# Patient Record
Sex: MALE | Race: WHITE | Employment: OTHER | URBAN - METROPOLITAN AREA
[De-identification: names, ages, dates, MRNs, and addresses within clinical notes are randomized per-mention and may not be internally consistent; named-entity substitution may affect disease eponyms.]

---

## 2017-03-13 ENCOUNTER — PATIENT OUTREACH (OUTPATIENT)
Dept: INTERNAL MEDICINE CLINIC | Facility: CLINIC | Age: 82
End: 2017-03-13

## 2017-03-13 NOTE — PROGRESS NOTES
Spoke with patient spouse to have patient call office and schedule an AWV; last was 02/26/2016; spouse informed she will let patient know.

## 2017-04-09 DIAGNOSIS — E03.4 HYPOTHYROIDISM DUE TO ACQUIRED ATROPHY OF THYROID: Primary | ICD-10-CM

## 2017-04-10 RX ORDER — LEVOTHYROXINE SODIUM 0.1 MG/1
TABLET ORAL
Qty: 90 TABLET | Refills: 1 | Status: SHIPPED | OUTPATIENT
Start: 2017-04-10 | End: 2017-06-26

## 2017-04-15 ENCOUNTER — MED REC SCAN ONLY (OUTPATIENT)
Dept: INTERNAL MEDICINE CLINIC | Facility: CLINIC | Age: 82
End: 2017-04-15

## 2017-06-25 DIAGNOSIS — E03.4 HYPOTHYROIDISM DUE TO ACQUIRED ATROPHY OF THYROID: ICD-10-CM

## 2017-06-26 RX ORDER — LEVOTHYROXINE SODIUM 0.1 MG/1
TABLET ORAL
Qty: 90 TABLET | Refills: 0 | Status: SHIPPED | OUTPATIENT
Start: 2017-06-26 | End: 2017-09-18

## 2017-08-14 DIAGNOSIS — E03.9 ACQUIRED HYPOTHYROIDISM: ICD-10-CM

## 2017-08-14 RX ORDER — LIOTHYRONINE SODIUM 5 UG/1
TABLET ORAL
Qty: 270 TABLET | Refills: 1 | OUTPATIENT
Start: 2017-08-14 | End: 2018-04-30

## 2017-08-17 ENCOUNTER — MED REC SCAN ONLY (OUTPATIENT)
Dept: INTERNAL MEDICINE CLINIC | Facility: CLINIC | Age: 82
End: 2017-08-17

## 2017-09-18 DIAGNOSIS — E03.4 HYPOTHYROIDISM DUE TO ACQUIRED ATROPHY OF THYROID: ICD-10-CM

## 2017-09-18 RX ORDER — LEVOTHYROXINE SODIUM 0.1 MG/1
TABLET ORAL
Qty: 90 TABLET | Refills: 0 | Status: SHIPPED | OUTPATIENT
Start: 2017-09-18 | End: 2018-05-15

## 2017-10-27 ENCOUNTER — MED REC SCAN ONLY (OUTPATIENT)
Dept: INTERNAL MEDICINE CLINIC | Facility: CLINIC | Age: 82
End: 2017-10-27

## 2017-12-13 ENCOUNTER — OFFICE VISIT (OUTPATIENT)
Dept: INTERNAL MEDICINE CLINIC | Facility: CLINIC | Age: 82
End: 2017-12-13

## 2017-12-13 VITALS
HEART RATE: 70 BPM | SYSTOLIC BLOOD PRESSURE: 122 MMHG | BODY MASS INDEX: 24.91 KG/M2 | OXYGEN SATURATION: 98 % | WEIGHT: 174 LBS | RESPIRATION RATE: 16 BRPM | TEMPERATURE: 98 F | DIASTOLIC BLOOD PRESSURE: 80 MMHG | HEIGHT: 70 IN

## 2017-12-13 DIAGNOSIS — Z12.11 COLON CANCER SCREENING: ICD-10-CM

## 2017-12-13 DIAGNOSIS — K21.9 GASTROESOPHAGEAL REFLUX DISEASE WITHOUT ESOPHAGITIS: ICD-10-CM

## 2017-12-13 DIAGNOSIS — E03.4 HYPOTHYROIDISM DUE TO ACQUIRED ATROPHY OF THYROID: ICD-10-CM

## 2017-12-13 DIAGNOSIS — Z00.00 ENCOUNTER FOR ANNUAL HEALTH EXAMINATION: Primary | ICD-10-CM

## 2017-12-13 PROCEDURE — G0439 PPPS, SUBSEQ VISIT: HCPCS | Performed by: STUDENT IN AN ORGANIZED HEALTH CARE EDUCATION/TRAINING PROGRAM

## 2017-12-13 NOTE — PATIENT INSTRUCTIONS
AMISHA EMMANUEL's SCREENING SCHEDULE   Tests on this list are recommended by your physician but may not be covered, or covered at this frequency, by your insurer. Please check with your insurance carrier before scheduling to verify coverage.     PREVEN Covered every 5 years No results found for this or any previous visit. No flowsheet data found.      Fecal Occult Blood   Covered Annually Occult Blood Result (no units)   Date Value   11/22/2016 Negative for Occult Blood   11/22/2016 Negative for Occult Bl be covered with your prescription benefits, but Medicare does not cover unless Medically needed    Zoster (Not covered by Medicare Part B) No orders found for this or any previous visit.  This may be covered with your pharmacy  prescription benefits     Rec

## 2017-12-13 NOTE — PROGRESS NOTES
HPI: Derek Frye is a 80year old male who presents for a Medicare Subsequent Annual Wellness visit (Pt already had Initial Annual Wellness).       His last annual assessment has been over 1 year: Annual Physical due on 02/26/2017      HPI:  Here quit greater than 12 months ago.   Smoking status: Former Smoker                                                              Packs/day: 0.00      Years: 0.00         Quit date: 4/17/1965  Smokeless tobacco: Never Used                             CAGE Claudean Alar Gastroenteritis; Hiatal hernia (1996); JAYLIN (obstructive sleep apnea); Reflux; and Unspecified disorder of thyroid.     He  has a past surgical history that includes colonoscopy (2004); diagnostic anoscopy (2004); hernia surgery (1998); upper gi endo poss ba Head:  Normocephalic, without obvious abnormality, atraumatic   Eyes:  PERRL, conjunctiva/corneas clear, EOM's intact, both eyes   Ears:  Normal TM's and external ear canals, both ears   Nose: Nares normal, septum midline, mucosa normal, no drainage or s (gastroesophageal reflux disease): well controlled. Continue Pantoprazole. Avoid gerd aggravating foods. Health maintenance: utd with vaccinations. Stool cards given to the patient today for colorectal Ca screening.   The patient indicates understanding of Ophthalmology Visit Annually: Diabetics, FHx Glaucoma, AA>50, > 65 No flowsheet data found. Prostate Cancer Screening      PSA  Annually There are no preventive care reminders to display for this patient.   Update Health Maintenance if applic

## 2018-04-26 ENCOUNTER — TELEPHONE (OUTPATIENT)
Dept: INTERNAL MEDICINE CLINIC | Facility: CLINIC | Age: 83
End: 2018-04-26

## 2018-04-26 ENCOUNTER — OFFICE VISIT (OUTPATIENT)
Dept: INTERNAL MEDICINE CLINIC | Facility: CLINIC | Age: 83
End: 2018-04-26

## 2018-04-26 VITALS
SYSTOLIC BLOOD PRESSURE: 126 MMHG | BODY MASS INDEX: 25.34 KG/M2 | WEIGHT: 177 LBS | RESPIRATION RATE: 16 BRPM | DIASTOLIC BLOOD PRESSURE: 88 MMHG | OXYGEN SATURATION: 96 % | HEART RATE: 67 BPM | TEMPERATURE: 98 F | HEIGHT: 70 IN

## 2018-04-26 DIAGNOSIS — I20.8 ATYPICAL ANGINA (HCC): Primary | ICD-10-CM

## 2018-04-26 DIAGNOSIS — Z12.11 COLON CANCER SCREENING: ICD-10-CM

## 2018-04-26 PROCEDURE — 93000 ELECTROCARDIOGRAM COMPLETE: CPT | Performed by: INTERNAL MEDICINE

## 2018-04-26 PROCEDURE — 99214 OFFICE O/P EST MOD 30 MIN: CPT | Performed by: INTERNAL MEDICINE

## 2018-04-26 NOTE — PROGRESS NOTES
HPI:    Patient ID: Loan Swartz is a 80year old male. Chest Pain    This is a new problem. The current episode started in the past 7 days. The onset quality is sudden. The problem occurs intermittently. The problem has been waxing and waning.  Lucy Nix Disp: 90 tablet Rfl: 1   Melatonin 5 MG Oral Tab Take by mouth nightly. Disp:  Rfl:    Multiple Vitamins-Minerals (MULTIVITAMIN OR) Take  by mouth. Disp:  Rfl:    Misc Natural Products (TURMERIC CURCUMIN) Oral Cap Take by mouth daily.    Disp:  Rfl:    Asco

## 2018-04-26 NOTE — TELEPHONE ENCOUNTER
Patient has not done his occult blood test because on the card it says not to do it if you have hemorrhoids. Please advise patient on what to do he is in office seeing  for another matter.

## 2018-04-26 NOTE — PATIENT INSTRUCTIONS
What Is Angina? Angina is a warning that your heart muscle is not getting enough oxygen-rich blood and is at risk for damage. Medicines, certain medical procedures, and lifestyle changes can help control angina.  Talk with your healthcare provider about

## 2018-04-26 NOTE — TELEPHONE ENCOUNTER
Dr. Jaron Sow has discussed recommendations with patient. Advised he proceed with test.   Patient aware and agreed with plan.

## 2018-04-30 ENCOUNTER — TELEPHONE (OUTPATIENT)
Dept: INTERNAL MEDICINE CLINIC | Facility: CLINIC | Age: 83
End: 2018-04-30

## 2018-04-30 DIAGNOSIS — E03.9 ACQUIRED HYPOTHYROIDISM: ICD-10-CM

## 2018-04-30 RX ORDER — LIOTHYRONINE SODIUM 5 UG/1
TABLET ORAL
Qty: 270 TABLET | Refills: 1 | COMMUNITY
Start: 2018-04-30 | End: 2018-09-14

## 2018-04-30 NOTE — TELEPHONE ENCOUNTER
Refill request from [facility] for Liothyronine w/ methocel 7.5mcg capsules, qty 270. Fax back to #[phone]. Form in triage.

## 2018-05-04 ENCOUNTER — HOSPITAL ENCOUNTER (EMERGENCY)
Facility: HOSPITAL | Age: 83
Discharge: LEFT BEFORE DISCHARGE | End: 2018-05-04
Payer: MEDICARE

## 2018-05-04 VITALS
HEART RATE: 68 BPM | BODY MASS INDEX: 23.62 KG/M2 | OXYGEN SATURATION: 98 % | SYSTOLIC BLOOD PRESSURE: 125 MMHG | WEIGHT: 165 LBS | RESPIRATION RATE: 18 BRPM | HEIGHT: 70 IN | TEMPERATURE: 98 F | DIASTOLIC BLOOD PRESSURE: 78 MMHG

## 2018-05-04 NOTE — ED INITIAL ASSESSMENT (HPI)
Patient was stung by a hornet about 1.5 hours ago. Under his right arm.  He immediately took benadryl one pill=25mg  Denies SOB CP   25 years ago he was stung by a bee and had a reaction can not remember what his symptom were

## 2018-05-15 DIAGNOSIS — E03.4 HYPOTHYROIDISM DUE TO ACQUIRED ATROPHY OF THYROID: ICD-10-CM

## 2018-05-15 RX ORDER — LEVOTHYROXINE SODIUM 0.1 MG/1
TABLET ORAL
Qty: 90 TABLET | Refills: 0 | Status: SHIPPED | OUTPATIENT
Start: 2018-05-15 | End: 2018-07-30

## 2018-05-15 NOTE — TELEPHONE ENCOUNTER
Last TSH 10/2017 and it was normal. Last OV 4/26/18. Ok per Dr. Hany Vogt to refill.      Repeat labs 10/2018

## 2018-05-27 PROCEDURE — 82270 OCCULT BLOOD FECES: CPT

## 2018-05-27 PROCEDURE — 82272 OCCULT BLD FECES 1-3 TESTS: CPT

## 2018-05-30 ENCOUNTER — APPOINTMENT (OUTPATIENT)
Dept: LAB | Age: 83
End: 2018-05-30
Attending: INTERNAL MEDICINE
Payer: MEDICARE

## 2018-05-30 DIAGNOSIS — Z12.11 COLON CANCER SCREENING: ICD-10-CM

## 2018-07-30 DIAGNOSIS — E03.4 HYPOTHYROIDISM DUE TO ACQUIRED ATROPHY OF THYROID: ICD-10-CM

## 2018-07-30 RX ORDER — LEVOTHYROXINE SODIUM 0.1 MG/1
TABLET ORAL
Qty: 90 TABLET | Refills: 0 | Status: SHIPPED | OUTPATIENT
Start: 2018-07-30 | End: 2018-09-14

## 2018-09-27 ENCOUNTER — OFFICE VISIT (OUTPATIENT)
Dept: INTERNAL MEDICINE CLINIC | Facility: CLINIC | Age: 83
End: 2018-09-27
Payer: MEDICARE

## 2018-09-27 VITALS
SYSTOLIC BLOOD PRESSURE: 132 MMHG | WEIGHT: 173 LBS | BODY MASS INDEX: 24.77 KG/M2 | HEART RATE: 69 BPM | RESPIRATION RATE: 16 BRPM | HEIGHT: 70 IN | DIASTOLIC BLOOD PRESSURE: 82 MMHG | TEMPERATURE: 98 F

## 2018-09-27 DIAGNOSIS — H81.13 BENIGN PAROXYSMAL POSITIONAL VERTIGO DUE TO BILATERAL VESTIBULAR DISORDER: Primary | ICD-10-CM

## 2018-09-27 DIAGNOSIS — Z23 NEED FOR INFLUENZA VACCINATION: ICD-10-CM

## 2018-09-27 DIAGNOSIS — K21.00 GASTROESOPHAGEAL REFLUX DISEASE WITH ESOPHAGITIS: ICD-10-CM

## 2018-09-27 PROCEDURE — 99214 OFFICE O/P EST MOD 30 MIN: CPT | Performed by: INTERNAL MEDICINE

## 2018-09-27 PROCEDURE — G0008 ADMIN INFLUENZA VIRUS VAC: HCPCS | Performed by: INTERNAL MEDICINE

## 2018-09-27 PROCEDURE — 90653 IIV ADJUVANT VACCINE IM: CPT | Performed by: INTERNAL MEDICINE

## 2018-09-27 RX ORDER — PREDNISONE 20 MG/1
TABLET ORAL
Qty: 14 TABLET | Refills: 0 | Status: SHIPPED | OUTPATIENT
Start: 2018-09-27 | End: 2019-01-29

## 2018-09-27 NOTE — PROGRESS NOTES
HPI:    Patient ID: Theresa Baugh is a 80year old male. Dizziness   This is a recurrent problem. The current episode started more than 1 month ago. The problem occurs every several days. The problem has been gradually worsening.  Associated symptom 14 tablet Rfl: 0   Levothyroxine Sodium 100 MCG Oral Tab Take 1 tablet (100 mcg total) by mouth once daily.  Disp: 90 tablet Rfl: 0   Liothyronine Sodium 5 MCG Oral Tab 7.5mcg TID Disp: 270 tablet Rfl: 1   metRONIDAZOLE 0.75 % External Cream Apply 1 Applica vaccination  - BPPV- finish prednisone. Refer to PT for correction  - gerd- stable.  Cont PPI  Orders Placed This Encounter      Fluad High Dose 72 ys and older 0.5 ml [61230]      Meds This Visit:  Requested Prescriptions     Signed Prescriptions Disp Refi

## 2018-10-15 DIAGNOSIS — E03.4 HYPOTHYROIDISM DUE TO ACQUIRED ATROPHY OF THYROID: ICD-10-CM

## 2018-10-15 RX ORDER — LEVOTHYROXINE SODIUM 0.1 MG/1
TABLET ORAL
Qty: 90 TABLET | Refills: 0 | OUTPATIENT
Start: 2018-10-15

## 2018-10-19 DIAGNOSIS — E03.4 HYPOTHYROIDISM DUE TO ACQUIRED ATROPHY OF THYROID: ICD-10-CM

## 2018-10-22 RX ORDER — LEVOTHYROXINE SODIUM 0.1 MG/1
TABLET ORAL
Qty: 90 TABLET | Refills: 0 | Status: SHIPPED | OUTPATIENT
Start: 2018-10-22 | End: 2019-01-07

## 2018-11-14 ENCOUNTER — TELEPHONE (OUTPATIENT)
Dept: INTERNAL MEDICINE CLINIC | Facility: CLINIC | Age: 83
End: 2018-11-14

## 2018-11-14 NOTE — TELEPHONE ENCOUNTER
Patient was referred to Dr Melonie Dubin, but when he called to make an appt was told the soonest opening was end of January - patient wants to know if there is another doctor we can recommend. Please c/b on cell #.

## 2018-11-14 NOTE — TELEPHONE ENCOUNTER
Spoke with pt he only asked to see Dr. Alessandro Zaman. Pt informed he can see anyone provider in that group. He will call back tomorrow for appt.

## 2019-01-02 LAB
AMB EXT CHOL/HDL RATIO: 4.4
AMB EXT CHOLESTEROL, TOTAL: 193 MG/DL
AMB EXT CREATININE: 0.92 MG/DL
AMB EXT GLUCOSE: 102 MG/DL
AMB EXT HDL CHOLESTEROL: 44 MG/DL
AMB EXT HEMATOCRIT: 43
AMB EXT HEMOGLOBIN: 14.4
AMB EXT HGBA1C: 5.4 %
AMB EXT LDL CHOLESTEROL, DIRECT: 126 MG/DL
AMB EXT MCV: 91
AMB EXT PLATELETS: 168
AMB EXT TRIGLYCERIDES: 115 MG/DL
AMB EXT TSH: 0.34 MIU/ML
AMB EXT WBC: 4.2 X10(3)UL

## 2019-01-07 DIAGNOSIS — E03.4 HYPOTHYROIDISM DUE TO ACQUIRED ATROPHY OF THYROID: ICD-10-CM

## 2019-01-07 RX ORDER — LEVOTHYROXINE SODIUM 0.1 MG/1
TABLET ORAL
Qty: 90 TABLET | Refills: 0 | Status: SHIPPED | OUTPATIENT
Start: 2019-01-07 | End: 2019-04-09

## 2019-01-29 ENCOUNTER — OFFICE VISIT (OUTPATIENT)
Dept: INTERNAL MEDICINE CLINIC | Facility: CLINIC | Age: 84
End: 2019-01-29
Payer: MEDICARE

## 2019-01-29 VITALS
SYSTOLIC BLOOD PRESSURE: 118 MMHG | HEART RATE: 68 BPM | WEIGHT: 170 LBS | RESPIRATION RATE: 16 BRPM | HEIGHT: 70 IN | BODY MASS INDEX: 24.34 KG/M2 | TEMPERATURE: 98 F | DIASTOLIC BLOOD PRESSURE: 60 MMHG

## 2019-01-29 DIAGNOSIS — E03.9 ACQUIRED HYPOTHYROIDISM: ICD-10-CM

## 2019-01-29 DIAGNOSIS — S39.011A STRAIN OF ABDOMINAL MUSCLE, INITIAL ENCOUNTER: Primary | ICD-10-CM

## 2019-01-29 PROCEDURE — 99213 OFFICE O/P EST LOW 20 MIN: CPT | Performed by: INTERNAL MEDICINE

## 2019-01-29 RX ORDER — LIOTHYRONINE SODIUM 5 UG/1
TABLET ORAL
Qty: 270 TABLET | Refills: 0 | Status: SHIPPED | OUTPATIENT
Start: 2019-01-29 | End: 2019-06-11

## 2019-01-29 NOTE — PROGRESS NOTES
HPI:    Patient ID: Vish Anderson is a 80year old male. Abdominal Pain   This is a new problem. The current episode started 1 to 4 weeks ago. The onset quality is sudden. The problem occurs intermittently. The problem has been unchanged.  The pain daily.   Disp:  Rfl:    Nutritional Supplements (5-HTP TRYPTOPHAN OR) Take  by mouth.  Disp:  Rfl:    Liothyronine Sodium 5 MCG Oral Tab 7.5mcg TID Disp: 270 tablet Rfl: 0     Allergies:No Known Allergies   PHYSICAL EXAM:   Physical Exam   Constitutional: H

## 2019-02-01 ENCOUNTER — MED REC SCAN ONLY (OUTPATIENT)
Dept: INTERNAL MEDICINE CLINIC | Facility: CLINIC | Age: 84
End: 2019-02-01

## 2019-02-11 ENCOUNTER — OFFICE VISIT (OUTPATIENT)
Dept: INTERNAL MEDICINE CLINIC | Facility: CLINIC | Age: 84
End: 2019-02-11
Payer: MEDICARE

## 2019-02-11 ENCOUNTER — TELEPHONE (OUTPATIENT)
Dept: INTERNAL MEDICINE CLINIC | Facility: CLINIC | Age: 84
End: 2019-02-11

## 2019-02-11 VITALS
HEART RATE: 73 BPM | SYSTOLIC BLOOD PRESSURE: 124 MMHG | TEMPERATURE: 98 F | DIASTOLIC BLOOD PRESSURE: 70 MMHG | RESPIRATION RATE: 16 BRPM | HEIGHT: 70 IN | BODY MASS INDEX: 24.34 KG/M2 | WEIGHT: 170 LBS

## 2019-02-11 DIAGNOSIS — K40.90 RIGHT INGUINAL HERNIA: Primary | ICD-10-CM

## 2019-02-11 PROCEDURE — 99213 OFFICE O/P EST LOW 20 MIN: CPT | Performed by: INTERNAL MEDICINE

## 2019-02-11 NOTE — TELEPHONE ENCOUNTER
Pt seen Dr. Rose Mary Avalos for abd pain and was told to call back if not better, pt states still having some pain and found a lump on rt side, call back

## 2019-02-11 NOTE — PROGRESS NOTES
HPI:    Patient ID: James Sanders is a 80year old male. Abdominal Pain   This is a new problem. The current episode started in the past 7 days. The onset quality is sudden. The problem has been unchanged.  Pain location: right inguinal. The pain is Disp:  Rfl:    PHOSPHATIDYL CHOLINE OR Take by mouth daily. Disp:  Rfl:    Nutritional Supplements (5-HTP TRYPTOPHAN OR) Take  by mouth.  Disp:  Rfl:      Allergies:No Known Allergies   PHYSICAL EXAM:   Physical Exam   Constitutional: He appears well-deve

## 2019-02-11 NOTE — TELEPHONE ENCOUNTER
Patient states he was seen a couple of weeks ago for low abd pain. Patient advised it was a muscle strain and has been treating with tylenol since.    Patient states he now has a lump on his lower right side and he does not think this was there when evaluat

## 2019-02-20 ENCOUNTER — OFFICE VISIT (OUTPATIENT)
Dept: SURGERY | Facility: CLINIC | Age: 84
End: 2019-02-20
Payer: MEDICARE

## 2019-02-20 VITALS
SYSTOLIC BLOOD PRESSURE: 138 MMHG | DIASTOLIC BLOOD PRESSURE: 74 MMHG | WEIGHT: 170 LBS | HEART RATE: 66 BPM | BODY MASS INDEX: 24 KG/M2

## 2019-02-20 DIAGNOSIS — K40.20 BILATERAL INGUINAL HERNIA WITHOUT OBSTRUCTION OR GANGRENE, RECURRENCE NOT SPECIFIED: Primary | ICD-10-CM

## 2019-02-20 PROCEDURE — 99203 OFFICE O/P NEW LOW 30 MIN: CPT | Performed by: SURGERY

## 2019-02-20 NOTE — H&P
New Patient Visit Note       Active Problems      1.  Bilateral inguinal hernia without obstruction or gangrene, recurrence not specified        Chief Complaint   Patient presents with:  Hernia: NP RIGHT INGUINAL HERNIA REF BY PCP, HAD PREVIOUS BILATERAL RE • HERNIA SURGERY  1998    laparoscopic repair   • UPPER GI ENDO POSS BARRTTS  4/14    poss Shane's; esophagitis; HH; polyp       The family history and social history have been reviewed by me today.     Family History   Problem Relation Age of Onset   • (5-HTP TRYPTOPHAN OR), Take  by mouth., Disp: , Rfl:       Review of Systems  The Review of Systems has been reviewed by me during today. Review of Systems   Constitutional: Negative for chills, diaphoresis, fatigue, fever and unexpected weight change. inguinal area. Genitourinary: Penis normal.       Right testis shows no mass and no swelling. Right testis is descended. Cremasteric reflex is not absent on the right side. Left testis shows no mass, no swelling and no tenderness.  Left testis is desc

## 2019-02-21 RX ORDER — CEFAZOLIN SODIUM/WATER 2 G/20 ML
2 SYRINGE (ML) INTRAVENOUS ONCE
Status: CANCELLED | OUTPATIENT
Start: 2019-02-21 | End: 2019-02-21

## 2019-02-21 RX ORDER — SODIUM CHLORIDE, SODIUM LACTATE, POTASSIUM CHLORIDE, CALCIUM CHLORIDE 600; 310; 30; 20 MG/100ML; MG/100ML; MG/100ML; MG/100ML
INJECTION, SOLUTION INTRAVENOUS CONTINUOUS
Status: CANCELLED | OUTPATIENT
Start: 2019-02-21

## 2019-02-21 RX ORDER — ACETAMINOPHEN 500 MG
1000 TABLET ORAL ONCE
Status: CANCELLED | OUTPATIENT
Start: 2019-02-21 | End: 2019-02-21

## 2019-02-21 RX ORDER — HEPARIN SODIUM 5000 [USP'U]/ML
5000 INJECTION, SOLUTION INTRAVENOUS; SUBCUTANEOUS ONCE
Status: CANCELLED | OUTPATIENT
Start: 2019-02-21 | End: 2019-02-21

## 2019-02-26 ENCOUNTER — OFFICE VISIT (OUTPATIENT)
Dept: INTERNAL MEDICINE CLINIC | Facility: CLINIC | Age: 84
End: 2019-02-26
Payer: MEDICARE

## 2019-02-26 VITALS
TEMPERATURE: 98 F | HEART RATE: 64 BPM | BODY MASS INDEX: 24.2 KG/M2 | HEIGHT: 70 IN | DIASTOLIC BLOOD PRESSURE: 60 MMHG | RESPIRATION RATE: 16 BRPM | WEIGHT: 169 LBS | SYSTOLIC BLOOD PRESSURE: 114 MMHG

## 2019-02-26 DIAGNOSIS — N41.0 ACUTE PROSTATITIS: ICD-10-CM

## 2019-02-26 DIAGNOSIS — Z01.810 PREOP CARDIOVASCULAR EXAM: Primary | ICD-10-CM

## 2019-02-26 DIAGNOSIS — K40.20 NON-RECURRENT BILATERAL INGUINAL HERNIA WITHOUT OBSTRUCTION OR GANGRENE: ICD-10-CM

## 2019-02-26 PROCEDURE — 99214 OFFICE O/P EST MOD 30 MIN: CPT | Performed by: INTERNAL MEDICINE

## 2019-02-26 RX ORDER — LEVOFLOXACIN 500 MG/1
500 TABLET, FILM COATED ORAL DAILY
Qty: 10 TABLET | Refills: 0 | Status: SHIPPED | OUTPATIENT
Start: 2019-02-26 | End: 2019-03-08

## 2019-02-27 NOTE — H&P
ED [facility], [address] [place] [address]    History and Physical    Samuel Lozano Patient Status:  No patient class for patient encounter    1932 MRN   [id number]   Location [address] [facility], [facility] Attending No att. nightly. Disp:  Rfl:    Multiple Vitamins-Minerals (MULTIVITAMIN OR) Take by mouth daily. Disp:  Rfl:    Misc Natural Products (TURMERIC CURCUMIN) Oral Cap Take by mouth daily.    Disp:  Rfl:    Ascorbic Acid (VITAMIN C) 1000 MG Oral Tab Take 1,000 mg by Drug use: No     Occ: . Exercise: three times per week, healthclub.   Diet: watches fats closely, watches sugar closely and watches calories closely     REVIEW OF SYSTEMS:   GENERAL: feels well otherwise  SKIN: denies any unusual skin lesions risks.  He is therefore cleared with low cardiac risk.  He will not require further cardiovascular testing.       History     Past Medical History:   Diagnosis Date   • Abdominal hernia    • Actinic keratosis    • Back pain    • Back problem    • Belching 01/02/2019     01/02/2019    TSH 0.343 01/02/2019               Assessment/Plan:     * No active hospital problems.  *      [unfilled]      Maryan Dela Cruz MD  2/27/2019

## 2019-03-05 ENCOUNTER — ANESTHESIA EVENT (OUTPATIENT)
Dept: ENDOSCOPY | Facility: HOSPITAL | Age: 84
End: 2019-03-05

## 2019-03-05 ENCOUNTER — ANESTHESIA (OUTPATIENT)
Dept: ENDOSCOPY | Facility: HOSPITAL | Age: 84
End: 2019-03-05

## 2019-03-05 ENCOUNTER — HOSPITAL ENCOUNTER (OUTPATIENT)
Facility: HOSPITAL | Age: 84
Setting detail: HOSPITAL OUTPATIENT SURGERY
Discharge: HOME | End: 2019-03-05
Attending: INTERNAL MEDICINE | Admitting: INTERNAL MEDICINE
Payer: MEDICARE

## 2019-03-05 VITALS
BODY MASS INDEX: 24.05 KG/M2 | OXYGEN SATURATION: 93 % | RESPIRATION RATE: 16 BRPM | WEIGHT: 168 LBS | TEMPERATURE: 98 F | HEIGHT: 70 IN | DIASTOLIC BLOOD PRESSURE: 80 MMHG | HEART RATE: 59 BPM | SYSTOLIC BLOOD PRESSURE: 150 MMHG

## 2019-03-05 DIAGNOSIS — K21.9 GASTROESOPHAGEAL REFLUX DISEASE WITHOUT ESOPHAGITIS: ICD-10-CM

## 2019-03-05 DIAGNOSIS — K29.40 ATROPHIC GASTRITIS WITHOUT HEMORRHAGE: ICD-10-CM

## 2019-03-05 PROCEDURE — 88305 TISSUE EXAM BY PATHOLOGIST: CPT | Performed by: INTERNAL MEDICINE

## 2019-03-05 PROCEDURE — 0DB58ZX EXCISION OF ESOPHAGUS, VIA NATURAL OR ARTIFICIAL OPENING ENDOSCOPIC, DIAGNOSTIC: ICD-10-PCS | Performed by: INTERNAL MEDICINE

## 2019-03-05 PROCEDURE — 0DB98ZX EXCISION OF DUODENUM, VIA NATURAL OR ARTIFICIAL OPENING ENDOSCOPIC, DIAGNOSTIC: ICD-10-PCS | Performed by: INTERNAL MEDICINE

## 2019-03-05 PROCEDURE — 0DB68ZX EXCISION OF STOMACH, VIA NATURAL OR ARTIFICIAL OPENING ENDOSCOPIC, DIAGNOSTIC: ICD-10-PCS | Performed by: INTERNAL MEDICINE

## 2019-03-05 RX ORDER — SODIUM CHLORIDE, SODIUM LACTATE, POTASSIUM CHLORIDE, CALCIUM CHLORIDE 600; 310; 30; 20 MG/100ML; MG/100ML; MG/100ML; MG/100ML
INJECTION, SOLUTION INTRAVENOUS CONTINUOUS
Status: DISCONTINUED | OUTPATIENT
Start: 2019-03-05 | End: 2019-03-05

## 2019-03-05 NOTE — H&P
[address] [facility] Patient Status:  Hospital Outpatient Surgery    1932 MRN [id number]   [facility] ENDOSCOPY Attending Ingris To MD   Date 3/5/2019 PCP Krystle Bravo MD     CC: KENNY jimenez h weight 168 lb, SpO2 100 %. General: Appears alert, oriented x3 and in no acute distress. HEENT: Normal.  CV: S1 and S2 normal.    Lungs: Clear to auscultation. Abdomen: Soft and nondistended. Nontender. No masses. Bowel sounds are present.   Extremi

## 2019-03-05 NOTE — ANESTHESIA POSTPROCEDURE EVALUATION
[address] [facility] Patient Status:  Hospital Outpatient Surgery   Age/Gender 80year old male MRN [id number]   Location [address] [facility] Attending Zhao Currie MD   [facility] Day # 0 PCP Candi Doty MD       Anesthesia Post-op

## 2019-03-05 NOTE — OPERATIVE REPORT
Harshad Reason Patient Status:  Hospital Outpatient Surgery    1932 MRN [id number]   [facility] ENDOSCOPY Attending Stoney Pemberton MD   Date 3/5/2019 PCP Temitope Burger MD     PREOPERATIVE DIAGNOSIS/INDICATION: GERD  POSTOPERT

## 2019-03-05 NOTE — ANESTHESIA PREPROCEDURE EVALUATION
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

## 2019-03-11 NOTE — PROGRESS NOTES
Date: 3/11/2019    To: Erik Engel  : 1932    I hope this letter finds you doing well. I am writing to inform you of the following:      The biopsies obtained at the time of your recent endoscopic procedure were benign and showed no evidence

## 2019-03-24 ENCOUNTER — ANESTHESIA EVENT (OUTPATIENT)
Dept: SURGERY | Facility: HOSPITAL | Age: 84
End: 2019-03-24
Payer: MEDICARE

## 2019-03-25 ENCOUNTER — TELEPHONE (OUTPATIENT)
Dept: SURGERY | Facility: CLINIC | Age: 84
End: 2019-03-25

## 2019-03-25 ENCOUNTER — HOSPITAL ENCOUNTER (EMERGENCY)
Facility: HOSPITAL | Age: 84
Discharge: HOME | End: 2019-03-25
Attending: EMERGENCY MEDICINE
Payer: MEDICARE

## 2019-03-25 ENCOUNTER — TELEPHONE (OUTPATIENT)
Dept: INTERNAL MEDICINE CLINIC | Facility: CLINIC | Age: 84
End: 2019-03-25

## 2019-03-25 ENCOUNTER — HOSPITAL ENCOUNTER (OUTPATIENT)
Facility: HOSPITAL | Age: 84
Setting detail: HOSPITAL OUTPATIENT SURGERY
Discharge: HOME | End: 2019-03-25
Attending: SURGERY | Admitting: SURGERY
Payer: MEDICARE

## 2019-03-25 ENCOUNTER — ANESTHESIA (OUTPATIENT)
Dept: SURGERY | Facility: HOSPITAL | Age: 84
End: 2019-03-25
Payer: MEDICARE

## 2019-03-25 VITALS
WEIGHT: 165.38 LBS | TEMPERATURE: 98 F | DIASTOLIC BLOOD PRESSURE: 72 MMHG | OXYGEN SATURATION: 99 % | SYSTOLIC BLOOD PRESSURE: 136 MMHG | BODY MASS INDEX: 23.68 KG/M2 | HEART RATE: 102 BPM | RESPIRATION RATE: 18 BRPM | HEIGHT: 70 IN

## 2019-03-25 VITALS
RESPIRATION RATE: 18 BRPM | OXYGEN SATURATION: 99 % | TEMPERATURE: 98 F | SYSTOLIC BLOOD PRESSURE: 117 MMHG | HEART RATE: 67 BPM | DIASTOLIC BLOOD PRESSURE: 51 MMHG

## 2019-03-25 DIAGNOSIS — K40.20 BILATERAL INGUINAL HERNIA WITHOUT OBSTRUCTION OR GANGRENE, RECURRENCE NOT SPECIFIED: ICD-10-CM

## 2019-03-25 DIAGNOSIS — R33.9 URINARY RETENTION: Primary | ICD-10-CM

## 2019-03-25 PROCEDURE — 0YU54JZ SUPPLEMENT RIGHT INGUINAL REGION WITH SYNTHETIC SUBSTITUTE, PERCUTANEOUS ENDOSCOPIC APPROACH: ICD-10-PCS | Performed by: SURGERY

## 2019-03-25 PROCEDURE — 8E0W4CZ ROBOTIC ASSISTED PROCEDURE OF TRUNK REGION, PERCUTANEOUS ENDOSCOPIC APPROACH: ICD-10-PCS | Performed by: SURGERY

## 2019-03-25 PROCEDURE — 81001 URINALYSIS AUTO W/SCOPE: CPT | Performed by: EMERGENCY MEDICINE

## 2019-03-25 PROCEDURE — 99284 EMERGENCY DEPT VISIT MOD MDM: CPT

## 2019-03-25 PROCEDURE — 51702 INSERT TEMP BLADDER CATH: CPT

## 2019-03-25 PROCEDURE — 0WJG4ZZ INSPECTION OF PERITONEAL CAVITY, PERCUTANEOUS ENDOSCOPIC APPROACH: ICD-10-PCS | Performed by: SURGERY

## 2019-03-25 PROCEDURE — 99283 EMERGENCY DEPT VISIT LOW MDM: CPT

## 2019-03-25 PROCEDURE — 49650 LAP ING HERNIA REPAIR INIT: CPT | Performed by: SURGERY

## 2019-03-25 DEVICE — PROGRIP MESH: Type: IMPLANTABLE DEVICE | Site: GROIN | Status: FUNCTIONAL

## 2019-03-25 RX ORDER — HYDROCODONE BITARTRATE AND ACETAMINOPHEN 5; 325 MG/1; MG/1
1 TABLET ORAL AS NEEDED
Status: COMPLETED | OUTPATIENT
Start: 2019-03-25 | End: 2019-03-25

## 2019-03-25 RX ORDER — ACETAMINOPHEN 500 MG
1000 TABLET ORAL EVERY 6 HOURS PRN
COMMUNITY
End: 2019-04-09 | Stop reason: ALTCHOICE

## 2019-03-25 RX ORDER — SODIUM CHLORIDE, SODIUM LACTATE, POTASSIUM CHLORIDE, CALCIUM CHLORIDE 600; 310; 30; 20 MG/100ML; MG/100ML; MG/100ML; MG/100ML
INJECTION, SOLUTION INTRAVENOUS CONTINUOUS
Status: DISCONTINUED | OUTPATIENT
Start: 2019-03-25 | End: 2019-03-25

## 2019-03-25 RX ORDER — LIDOCAINE HYDROCHLORIDE 20 MG/ML
10 JELLY TOPICAL ONCE
Status: COMPLETED | OUTPATIENT
Start: 2019-03-25 | End: 2019-03-25

## 2019-03-25 RX ORDER — MEPERIDINE HYDROCHLORIDE 25 MG/ML
12.5 INJECTION INTRAMUSCULAR; INTRAVENOUS; SUBCUTANEOUS AS NEEDED
Status: DISCONTINUED | OUTPATIENT
Start: 2019-03-25 | End: 2019-03-25

## 2019-03-25 RX ORDER — LABETALOL HYDROCHLORIDE 5 MG/ML
5 INJECTION, SOLUTION INTRAVENOUS EVERY 5 MIN PRN
Status: DISCONTINUED | OUTPATIENT
Start: 2019-03-25 | End: 2019-03-25

## 2019-03-25 RX ORDER — MIDAZOLAM HYDROCHLORIDE 1 MG/ML
1 INJECTION INTRAMUSCULAR; INTRAVENOUS EVERY 5 MIN PRN
Status: DISCONTINUED | OUTPATIENT
Start: 2019-03-25 | End: 2019-03-25

## 2019-03-25 RX ORDER — DOCUSATE SODIUM 100 MG/1
100 CAPSULE, LIQUID FILLED ORAL 3 TIMES DAILY
Qty: 90 CAPSULE | Refills: 0 | Status: SHIPPED | OUTPATIENT
Start: 2019-03-25 | End: 2019-03-26

## 2019-03-25 RX ORDER — HYDROMORPHONE HYDROCHLORIDE 1 MG/ML
0.4 INJECTION, SOLUTION INTRAMUSCULAR; INTRAVENOUS; SUBCUTANEOUS EVERY 5 MIN PRN
Status: DISCONTINUED | OUTPATIENT
Start: 2019-03-25 | End: 2019-03-25

## 2019-03-25 RX ORDER — CEFAZOLIN SODIUM/WATER 2 G/20 ML
2 SYRINGE (ML) INTRAVENOUS ONCE
Status: COMPLETED | OUTPATIENT
Start: 2019-03-25 | End: 2019-03-25

## 2019-03-25 RX ORDER — ACETAMINOPHEN 500 MG
1000 TABLET ORAL ONCE
Status: COMPLETED | OUTPATIENT
Start: 2019-03-25 | End: 2019-03-25

## 2019-03-25 RX ORDER — HYDROCODONE BITARTRATE AND ACETAMINOPHEN 5; 325 MG/1; MG/1
TABLET ORAL
Qty: 20 TABLET | Refills: 0 | Status: SHIPPED | OUTPATIENT
Start: 2019-03-25 | End: 2019-04-09 | Stop reason: ALTCHOICE

## 2019-03-25 RX ORDER — BUPIVACAINE HYDROCHLORIDE AND EPINEPHRINE 5; 5 MG/ML; UG/ML
INJECTION, SOLUTION EPIDURAL; INTRACAUDAL; PERINEURAL AS NEEDED
Status: DISCONTINUED | OUTPATIENT
Start: 2019-03-25 | End: 2019-03-25 | Stop reason: HOSPADM

## 2019-03-25 RX ORDER — ONDANSETRON 2 MG/ML
4 INJECTION INTRAMUSCULAR; INTRAVENOUS AS NEEDED
Status: DISCONTINUED | OUTPATIENT
Start: 2019-03-25 | End: 2019-03-25

## 2019-03-25 RX ORDER — SODIUM CHLORIDE 9 MG/ML
INJECTION, SOLUTION INTRAVENOUS CONTINUOUS
Status: DISCONTINUED | OUTPATIENT
Start: 2019-03-25 | End: 2019-03-25

## 2019-03-25 RX ORDER — NALOXONE HYDROCHLORIDE 0.4 MG/ML
80 INJECTION, SOLUTION INTRAMUSCULAR; INTRAVENOUS; SUBCUTANEOUS AS NEEDED
Status: DISCONTINUED | OUTPATIENT
Start: 2019-03-25 | End: 2019-03-25

## 2019-03-25 RX ORDER — HYDROMORPHONE HYDROCHLORIDE 1 MG/ML
INJECTION, SOLUTION INTRAMUSCULAR; INTRAVENOUS; SUBCUTANEOUS
Status: COMPLETED
Start: 2019-03-25 | End: 2019-03-25

## 2019-03-25 RX ORDER — MORPHINE SULFATE 4 MG/ML
2 INJECTION, SOLUTION INTRAMUSCULAR; INTRAVENOUS EVERY 5 MIN PRN
Status: DISCONTINUED | OUTPATIENT
Start: 2019-03-25 | End: 2019-03-25

## 2019-03-25 RX ORDER — HYDROCODONE BITARTRATE AND ACETAMINOPHEN 5; 325 MG/1; MG/1
2 TABLET ORAL AS NEEDED
Status: COMPLETED | OUTPATIENT
Start: 2019-03-25 | End: 2019-03-25

## 2019-03-25 RX ORDER — HEPARIN SODIUM 5000 [USP'U]/ML
5000 INJECTION, SOLUTION INTRAVENOUS; SUBCUTANEOUS ONCE
Status: COMPLETED | OUTPATIENT
Start: 2019-03-25 | End: 2019-03-25

## 2019-03-25 NOTE — ANESTHESIA PREPROCEDURE EVALUATION
PRE-OP EVALUATION    Patient Name: Hui Watson    Pre-op Diagnosis: Bilateral inguinal hernia without obstruction or gangrene, recurrence not specified [K40.20]    Procedure(s):   ROBOTIC BILATERAL INGUINAL HERNIA REPAIR WITH MESH    Surgeon(s) and hypothyroidism                       Pulmonary    Negative pulmonary ROS.                (+) sleep apnea       Neuro/Psych    Negative neuro/psych ROS.                                 Past Surgical History:   Procedure Laterality Date   • COLONOSCOPY  2004 related to medications administered. Questions answered and patient wishes to proceed.        Present on Admission:  **None**

## 2019-03-25 NOTE — ANESTHESIA POSTPROCEDURE EVALUATION
[address] [facility] Patient Status:  Hospital Outpatient Surgery   Age/Gender 80year old male MRN [id number]   [facility] SURGERY Attending Kassandra Holly, Nam Gtz MD   Hosp Day # 0 PCP Miguel Grullon MD       Anesthesia Post-op N

## 2019-03-25 NOTE — OR NURSING
At 12pm  Patient left RX. Patient notified and instructed to go to security and security will call RN and will bring down RX.  At 4:10 pm attempted to call patient no answer message left

## 2019-03-25 NOTE — TELEPHONE ENCOUNTER
Called patient and advised to contact surgeon to inform of symptoms/complications. He verbalized understanding and stated he was on the other line with their office. Patient denied stomach pain, fever, N/V.      He will call us back should he need fu

## 2019-03-25 NOTE — H&P
History & Physical Examination    Patient Name: Emily Valdivia  MRN: [id number]  CSN: [id number]  YOB: 1932    Diagnosis: BIH    Present Illness: Hx BIH      Medications Prior to Admission:  acetaminophen 500 MG Oral Tab Take 1,000 mg by m • Back pain    • Back problem    • Belching    • Colon polyps 1975   • Esophageal reflux    • Fainting    • Fatigue    • Frequent urination    • Frequent UTI    • Gastroenteritis    • Hearing loss    • Hemorrhoids    • Hiatal hernia 1996   • JAYLIN (obstruc

## 2019-03-25 NOTE — ED PROVIDER NOTES
Patient Seen in: [facility] Emergency Department    History   Patient presents with:  Retention    Stated Complaint: urinary retention    HPI    Patient presents with urinary retention. The patient had a hernia repair surgery this morning.   He had stated complaint: urinary retention  Other systems are as noted in HPI. Constitutional and vital signs reviewed. All other systems reviewed and negative except as noted above.     Physical Exam     ED Triage Vitals [03/25/19 1554]   /77   Pulse [address], [address] [place] [address]  [phone]    Call in 1 day          Medications Prescribed:  Discharge Medication List as of 3/25/2019  5:24 PM

## 2019-03-25 NOTE — BRIEF OP NOTE
Pre-Operative Diagnosis: Bilateral inguinal hernia without obstruction or gangrene, recurrence not specified [K40.20]     Post-Operative Diagnosis: Right direct inguinal hernia; no left inguinal hernia   Procedure Performed:   Procedure(s):  ·  ROBOTIC RIG

## 2019-03-25 NOTE — OPERATIVE REPORT
OPERATIVE REPORT    PREOPERATIVE DIAGNOSIS:  Bilateral inguinal hernia. POSTOPERATIVE DIAGNOSIS:  Right direct  inguinal hernia. No left inguinal hernia  PROCEDURE PERFORMED:  1. Robotic Right inguinal hernia repair with mesh ( ProGrip mesh used)    2.  Ro the patient's satisfaction after which the patient provided willing and informed consent to proceed with surgery.     PROCEDURE: The patient was brought to the operating room, and after induction of general endotracheal anesthesia, the abdomen was prepped a cavity. Once this was accomplished, a ProGrip  mesh was placed in the inguinal region. Once the mesh was in place, the peritoneal flap was then reapproximated in anatomic position and secured using running 2-0 V-Loc suture.  At the completion of the operati

## 2019-03-25 NOTE — ED NOTES
RN to bedside. Patient attempted to urinate, output of 20 mL. MD aware, plan of care discussed with patient and family. Indwelling catheter placed, sterile technique maintatined. Patient tolerated well. Will continue to monitor.

## 2019-03-25 NOTE — ED NOTES
Patient states at home s/p hernia repair surgery having a difficult time with urination. Patient stated attempted to urinate twice, one of the times had very little blood tinged urine out. Patient stated post hernia repair he did have a agarwal removed.  RN b

## 2019-03-25 NOTE — TELEPHONE ENCOUNTER
Patient calling  had hernia surgery with PBP and is having trouble urinating.  had sx this morning at 730  Is pushing fluids.  has urinated a few drops with some blood noticed. C/o sting- like pain. Denies fever or chills.   Informed p

## 2019-03-25 NOTE — TELEPHONE ENCOUNTER
Patient called and stated he just had hernia surgery, and drank a lot of water; came home, and cannot urinate. Please advise.

## 2019-03-26 ENCOUNTER — TELEPHONE (OUTPATIENT)
Dept: SURGERY | Facility: CLINIC | Age: 84
End: 2019-03-26

## 2019-03-26 RX ORDER — DOCUSATE SODIUM 100 MG/1
100 CAPSULE, LIQUID FILLED ORAL 3 TIMES DAILY
Qty: 90 CAPSULE | Refills: 0 | Status: SHIPPED | OUTPATIENT
Start: 2019-03-26 | End: 2019-04-09 | Stop reason: ALTCHOICE

## 2019-03-26 NOTE — TELEPHONE ENCOUNTER
Wife and  calling regarding constipation. Call was disconnected by them. I called patient back regarding  Patient states he is constipated. Is on Norco and had surgery yesterday.   Informed him he should be taking colace along with Norco.  Call w

## 2019-03-31 DIAGNOSIS — E03.4 HYPOTHYROIDISM DUE TO ACQUIRED ATROPHY OF THYROID: ICD-10-CM

## 2019-04-01 RX ORDER — LEVOTHYROXINE SODIUM 0.1 MG/1
TABLET ORAL
Qty: 90 TABLET | Refills: 0 | Status: SHIPPED | OUTPATIENT
Start: 2019-04-01 | End: 2019-04-09

## 2019-04-02 ENCOUNTER — OFFICE VISIT (OUTPATIENT)
Dept: SURGERY | Facility: CLINIC | Age: 84
End: 2019-04-02

## 2019-04-02 VITALS
DIASTOLIC BLOOD PRESSURE: 75 MMHG | WEIGHT: 169 LBS | SYSTOLIC BLOOD PRESSURE: 116 MMHG | BODY MASS INDEX: 24 KG/M2 | HEART RATE: 76 BPM | TEMPERATURE: 98 F

## 2019-04-02 DIAGNOSIS — K40.20 NON-RECURRENT BILATERAL INGUINAL HERNIA WITHOUT OBSTRUCTION OR GANGRENE: Primary | ICD-10-CM

## 2019-04-02 PROCEDURE — 99024 POSTOP FOLLOW-UP VISIT: CPT | Performed by: PHYSICIAN ASSISTANT

## 2019-04-02 NOTE — PROGRESS NOTES
Post Operative Visit Note       Active Problems  1.  Non-recurrent bilateral inguinal hernia without obstruction or gangrene         Chief Complaint   Patient presents with:  Post-Op: P/O 3/25 Robotic Right inguinal hernia repair with mesh, pt c/o of rt and 4/17/2014    Performed by Lexus Yi MD at [address] [place] [address]    laparoscopic repair   • UPPER GI ENDO POSS NIKHIL  4/14    poss Shane's; esophagitis; HH; polyp   • XI ROBOT-ASSISTED LAPAROSCOPIC INGUINAL HERNIA R mouth daily. , Disp: , Rfl:   •  ALPHA-LIPOIC ACID OR, Take by mouth daily. , Disp: , Rfl:   •  PHOSPHATIDYL CHOLINE OR, Take by mouth daily. , Disp: , Rfl:   •  Nutritional Supplements (5-HTP TRYPTOPHAN OR), Take by mouth daily.   , Disp: , Rfl:   •  Levo guarding. No hernia. Abdominal exam: Soft, nontender, nondistended, good bowel sounds. Incisions: All laparoscopic incisions are clean, dry, intact, without erythema, or cellulitis.      Neurological: He is alert and oriented to person, place, and

## 2019-04-09 ENCOUNTER — OFFICE VISIT (OUTPATIENT)
Dept: INTERNAL MEDICINE CLINIC | Facility: CLINIC | Age: 84
End: 2019-04-09
Payer: MEDICARE

## 2019-04-09 VITALS
HEART RATE: 70 BPM | SYSTOLIC BLOOD PRESSURE: 112 MMHG | DIASTOLIC BLOOD PRESSURE: 70 MMHG | TEMPERATURE: 98 F | BODY MASS INDEX: 23.48 KG/M2 | HEIGHT: 70 IN | WEIGHT: 164 LBS | RESPIRATION RATE: 16 BRPM

## 2019-04-09 DIAGNOSIS — Z00.00 ANNUAL PHYSICAL EXAM: Primary | ICD-10-CM

## 2019-04-09 DIAGNOSIS — K21.9 GASTROESOPHAGEAL REFLUX DISEASE WITHOUT ESOPHAGITIS: ICD-10-CM

## 2019-04-09 DIAGNOSIS — E03.4 HYPOTHYROIDISM DUE TO ACQUIRED ATROPHY OF THYROID: ICD-10-CM

## 2019-04-09 DIAGNOSIS — Z00.00 ENCOUNTER FOR ANNUAL HEALTH EXAMINATION: ICD-10-CM

## 2019-04-09 PROBLEM — K40.20 BILATERAL INGUINAL HERNIA WITHOUT OBSTRUCTION OR GANGRENE: Status: RESOLVED | Noted: 2019-02-20 | Resolved: 2019-04-09

## 2019-04-09 PROCEDURE — G0439 PPPS, SUBSEQ VISIT: HCPCS | Performed by: INTERNAL MEDICINE

## 2019-04-09 RX ORDER — LEVOTHYROXINE SODIUM 0.1 MG/1
100 TABLET ORAL
Qty: 90 TABLET | Refills: 3 | Status: SHIPPED | OUTPATIENT
Start: 2019-04-09 | End: 2019-09-11

## 2019-04-09 NOTE — PATIENT INSTRUCTIONS
Lissette Hernandez's SCREENING SCHEDULE   Tests on this list are recommended by your physician but may not be covered, or covered at this frequency, by your insurer. Please check with your insurance carrier before scheduling to verify coverage.     PREVEN reminders to display for this patient. Update Health Maintenance if applicable    Flex Sigmoidoscopy Screen  Covered every 5 years No results found for this or any previous visit. No flowsheet data found.      Fecal Occult Blood   Covered Annually Occult Bl with metal- TD and TDaP Not covered by Medicare Part B) No orders found for this or any previous visit.  This may be covered with your prescription benefits, but Medicare does not cover unless Medically needed    Zoster (Not covered by Medicare Part B) No o

## 2019-04-09 NOTE — PROGRESS NOTES
HPI:   Soraida Khan is a 80year old male who presents for a Medicare Subsequent Annual Wellness visit (Pt already had Initial Annual Wellness).     HPI:  Here for AWV  Normal state of health  No complaints    PAST MEDICAL, SOCIAL, FAMILY HISTORIES RE of 0 so is at low risk.      Patient Care Team: Patient Care Team:  Marguerite Oneil MD as PCP - General (Internal Medicine)  [facility]    Patient Active Problem List:     Hypothyroidism     GERD (gastroesophageal reflux disease)    Wt R (1975), Esophageal reflux, Fainting, Fatigue, Frequent urination, Frequent UTI, Gastroenteritis, Hearing loss, Hemorrhoids, Hiatal hernia (1996), JAYLIN (obstructive sleep apnea), Reflux, Sleep apnea, Unspecified disorder of thyroid, Visual impairment, and We Method:  Finger Rub  Finger Rub Result:  Pass               Visual Acuity  Right Eye Visual Acuity: Corrected Right Eye Chart Acuity: 20/20   Left Eye Visual Acuity: Corrected Left Eye Chart Acuity: 20/20   Both Eyes Visual Acuity: Corrected Both Eyes Dorothy CONDITIONS:   Hui Watson is a 80year old male who presents for a Medicare Assessment.      PLAN SUMMARY:   Diagnoses and all orders for this visit:    Annual physical exam    Gastroesophageal reflux disease without esophagitis    Hypothyroidism due this patient. Update Health Maintenance if applicable    Flex Sigmoidoscopy Screen every 10 years No results found for this or any previous visit. No flowsheet data found.      Fecal Occult Blood Annually Occult Blood Result (no units)   Date Value   05/27/ daily. Disp: 90 tablet Rfl: 3   Liothyronine Sodium 5 MCG Oral Tab 7.5mcg TID Disp: 270 tablet Rfl: 0   TRETINOIN 0.1 % External Cream APPLY EXTERNALLY TO FACE EVERY DAY Disp: 45 g Rfl: 0   FINACEA 15 % External Gel APPLY TO FACE TWICE DAILY Disp: 50 g Rfl

## 2019-06-11 DIAGNOSIS — E03.9 ACQUIRED HYPOTHYROIDISM: ICD-10-CM

## 2019-06-11 RX ORDER — LIOTHYRONINE SODIUM 5 UG/1
TABLET ORAL
Qty: 270 TABLET | Refills: 2 | Status: SHIPPED | OUTPATIENT
Start: 2019-06-11 | End: 2020-07-20

## 2019-06-11 NOTE — TELEPHONE ENCOUNTER
Pharmacy request for refill Liothyronine Sodium 5 MCG Oral Tab qty 270. Please send to [address] on file.

## 2019-09-11 ENCOUNTER — TELEPHONE (OUTPATIENT)
Dept: INTERNAL MEDICINE CLINIC | Facility: CLINIC | Age: 84
End: 2019-09-11

## 2019-09-11 DIAGNOSIS — E03.4 HYPOTHYROIDISM DUE TO ACQUIRED ATROPHY OF THYROID: ICD-10-CM

## 2019-09-11 RX ORDER — LEVOTHYROXINE SODIUM 0.1 MG/1
100 TABLET ORAL
Qty: 90 TABLET | Refills: 0 | Status: SHIPPED | OUTPATIENT
Start: 2019-09-11 | End: 2020-04-05

## 2019-09-11 NOTE — TELEPHONE ENCOUNTER
Patient asking if we can call Clara to authorize another refill for Levothyroxine Sodium 100 MCG - he says he just got a 90 day supply refill last week, but his cleaning lady threw out the bottle by mistake.    Please call patient on cell # if any quest

## 2019-12-27 ENCOUNTER — TELEPHONE (OUTPATIENT)
Dept: INTERNAL MEDICINE CLINIC | Facility: CLINIC | Age: 84
End: 2019-12-27

## 2019-12-27 NOTE — TELEPHONE ENCOUNTER
Patient called and stated he is in [place] [address], and thinks he may have an infection on his lower abdomen. He is requesting a call back, and will inform the pharmacy closest to him. He stated previously Dr Elena Grimm had prescribed a medication.

## 2019-12-27 NOTE — TELEPHONE ENCOUNTER
D/w pt recommendations. He states pain is not severe. He will monitor and present to ER if symptoms worsen.

## 2019-12-27 NOTE — TELEPHONE ENCOUNTER
pts states lower abdominal pain has presented over the last two weeks. Pain is below the navel and above perineal area . Pain is dull achy and constant, no use of OTC medication have been used to management.  Also states lower back pain presented at similar

## 2020-03-04 ENCOUNTER — TELEPHONE (OUTPATIENT)
Dept: INTERNAL MEDICINE CLINIC | Facility: CLINIC | Age: 85
End: 2020-03-04

## 2020-03-04 NOTE — TELEPHONE ENCOUNTER
Pt states over the last two days he has presented with mild head ache, and mild congestion, and mild non consistent dry cough.  Pt denies any  Naus/vom, chills, fever or body aches, nasal discharge, sore throat, pr denies any contact with sick friends or fa

## 2020-04-04 DIAGNOSIS — E03.4 HYPOTHYROIDISM DUE TO ACQUIRED ATROPHY OF THYROID: ICD-10-CM

## 2020-04-05 RX ORDER — LEVOTHYROXINE SODIUM 0.1 MG/1
TABLET ORAL
Qty: 90 TABLET | Refills: 0 | Status: SHIPPED | OUTPATIENT
Start: 2020-04-05 | End: 2020-06-30

## 2020-06-30 DIAGNOSIS — E03.4 HYPOTHYROIDISM DUE TO ACQUIRED ATROPHY OF THYROID: ICD-10-CM

## 2020-06-30 RX ORDER — LEVOTHYROXINE SODIUM 0.1 MG/1
TABLET ORAL
Qty: 90 TABLET | Refills: 0 | Status: SHIPPED | OUTPATIENT
Start: 2020-06-30 | End: 2020-09-28

## 2020-07-20 DIAGNOSIS — E03.9 ACQUIRED HYPOTHYROIDISM: ICD-10-CM

## 2020-07-20 RX ORDER — LIOTHYRONINE SODIUM 5 UG/1
TABLET ORAL
Qty: 270 TABLET | Refills: 0 | Status: SHIPPED | OUTPATIENT
Start: 2020-07-20 | End: 2020-12-14

## 2020-07-20 NOTE — TELEPHONE ENCOUNTER
Last time medication was refilled 6/11/19  Quantity and number of refills 270 w/ 2   Last OV 4/19/19  Next OV 4/2020

## 2020-09-17 ENCOUNTER — TELEPHONE (OUTPATIENT)
Dept: INTERNAL MEDICINE CLINIC | Facility: CLINIC | Age: 85
End: 2020-09-17

## 2020-09-17 NOTE — TELEPHONE ENCOUNTER
Patient called and questioned he took a flu shot 10 years ago, and ended up getting the flu. He is questioning what happens if he takes it now? Will he get the flu? Please advise.

## 2020-09-22 NOTE — TELEPHONE ENCOUNTER
Spoke with pt explaining vaccine can cause: Soreness, redness, and swelling where shot is given, fever, muscle aches, and headache can happen after influenza vaccine  He expressed understanding.

## 2020-09-26 DIAGNOSIS — E03.4 HYPOTHYROIDISM DUE TO ACQUIRED ATROPHY OF THYROID: ICD-10-CM

## 2020-09-28 ENCOUNTER — OFFICE VISIT (OUTPATIENT)
Dept: INTERNAL MEDICINE CLINIC | Facility: CLINIC | Age: 85
End: 2020-09-28
Payer: MEDICARE

## 2020-09-28 VITALS
WEIGHT: 161 LBS | BODY MASS INDEX: 25.27 KG/M2 | TEMPERATURE: 97 F | OXYGEN SATURATION: 97 % | DIASTOLIC BLOOD PRESSURE: 68 MMHG | RESPIRATION RATE: 16 BRPM | HEART RATE: 64 BPM | SYSTOLIC BLOOD PRESSURE: 126 MMHG | HEIGHT: 67 IN

## 2020-09-28 DIAGNOSIS — E03.4 HYPOTHYROIDISM DUE TO ACQUIRED ATROPHY OF THYROID: ICD-10-CM

## 2020-09-28 DIAGNOSIS — Z00.00 ENCOUNTER FOR ANNUAL HEALTH EXAMINATION: ICD-10-CM

## 2020-09-28 DIAGNOSIS — K21.9 GASTROESOPHAGEAL REFLUX DISEASE WITHOUT ESOPHAGITIS: ICD-10-CM

## 2020-09-28 DIAGNOSIS — Z23 NEED FOR INFLUENZA VACCINATION: ICD-10-CM

## 2020-09-28 DIAGNOSIS — Z00.00 ANNUAL PHYSICAL EXAM: Primary | ICD-10-CM

## 2020-09-28 PROCEDURE — G0439 PPPS, SUBSEQ VISIT: HCPCS | Performed by: INTERNAL MEDICINE

## 2020-09-28 PROCEDURE — G0008 ADMIN INFLUENZA VIRUS VAC: HCPCS | Performed by: INTERNAL MEDICINE

## 2020-09-28 PROCEDURE — 90662 IIV NO PRSV INCREASED AG IM: CPT | Performed by: INTERNAL MEDICINE

## 2020-09-28 RX ORDER — LEVOTHYROXINE SODIUM 0.1 MG/1
TABLET ORAL
Qty: 30 TABLET | Refills: 0 | Status: SHIPPED | OUTPATIENT
Start: 2020-09-28 | End: 2020-09-28

## 2020-09-28 RX ORDER — LEVOTHYROXINE SODIUM 0.1 MG/1
TABLET ORAL
Qty: 90 TABLET | Refills: 0 | OUTPATIENT
Start: 2020-09-28

## 2020-09-28 RX ORDER — LEVOTHYROXINE SODIUM 0.1 MG/1
100 TABLET ORAL DAILY
Qty: 90 TABLET | Refills: 3 | Status: SHIPPED | OUTPATIENT
Start: 2020-09-28 | End: 2021-09-22

## 2020-09-28 NOTE — PROGRESS NOTES
HPI:   Roberta Wright is a 80year old male who presents for a Medicare Subsequent Annual Wellness visit (Pt already had Initial Annual Wellness).     HPI:  Here for AWV  No complaints  Normal state of health  Labs from wellness program reviewed with pt of 0 so is at low risk.      Patient Care Team: Patient Care Team:  Robby Azevedo MD as PCP - General (Internal Medicine)  [facility]    Patient Active Problem List:     Hypothyroidism     GERD (gastroesophageal reflux disease)    Wt R Frequent urination, Frequent UTI, Gastroenteritis, Hearing loss, Hemorrhoids, Hiatal hernia (1996), JAYLIN (obstructive sleep apnea), Reflux, Sleep apnea, Unspecified disorder of thyroid, Visual impairment, and Wears glasses.     He  has a past surgical histor General Appearance:  Alert, cooperative, no distress, appears stated age   Head:  Normocephalic, without obvious abnormality, atraumatic   Eyes:  PERRL, conjunctiva/corneas clear, EOM's intact, both eyes   Ears:  Normal TM's and exter acquired atrophy of thyroid  -     Levothyroxine Sodium 100 MCG Oral Tab; Take 1 tablet (100 mcg total) by mouth daily.     Gastroesophageal reflux disease without esophagitis    Need for influenza vaccination  -     FLU VACC HIGH DOSE PRSV FREE         Die Occult Blood Annually Occult Blood Result (no units)   Date Value   05/27/2018 Negative for Occult Blood   05/27/2018 Negative for Occult Blood   05/27/2018 Negative for Occult Blood    No flowsheet data found.     Glaucoma Screening      Ophthalmology Visi

## 2020-09-28 NOTE — PATIENT INSTRUCTIONS
Patricia Hernandez's SCREENING SCHEDULE   Tests on this list are recommended by your physician but may not be covered, or covered at this frequency, by your insurer. Please check with your insurance carrier before scheduling to verify coverage.     PREVEN reminders to display for this patient. Update Health Maintenance if applicable    Flex Sigmoidoscopy Screen  Covered every 5 years No results found for this or any previous visit. No flowsheet data found.      Fecal Occult Blood   Covered Annually Occult Bl Toxoid- Only covered with a cut with metal- TD and TDaP Not covered by Medicare Part B) No orders found for this or any previous visit.  This may be covered with your prescription benefits, but Medicare does not cover unless Medically needed    Zoster (Not

## 2020-12-14 DIAGNOSIS — E03.9 ACQUIRED HYPOTHYROIDISM: ICD-10-CM

## 2020-12-14 RX ORDER — LIOTHYRONINE SODIUM 5 UG/1
TABLET ORAL
Qty: 270 TABLET | Refills: 1 | Status: SHIPPED | OUTPATIENT
Start: 2020-12-14 | End: 2021-03-26

## 2020-12-14 NOTE — TELEPHONE ENCOUNTER
Labs done 9/2020 another provider manages thyroid condition.      Sent to Dr. Edilia Perry for approval.

## 2021-01-27 ENCOUNTER — TELEPHONE (OUTPATIENT)
Dept: INTERNAL MEDICINE CLINIC | Facility: CLINIC | Age: 86
End: 2021-01-27

## 2021-01-27 NOTE — TELEPHONE ENCOUNTER
Patient called and said that Dr. Shade Lewis told him to speak with Bel Aguirre.  He stated he was  Dr. Alejandro barrow and it was concerning COVID

## 2021-03-25 ENCOUNTER — LAB ENCOUNTER (OUTPATIENT)
Dept: LAB | Age: 86
End: 2021-03-25
Attending: INTERNAL MEDICINE
Payer: MEDICARE

## 2021-03-25 ENCOUNTER — OFFICE VISIT (OUTPATIENT)
Dept: INTERNAL MEDICINE CLINIC | Facility: CLINIC | Age: 86
End: 2021-03-25
Payer: MEDICARE

## 2021-03-25 VITALS
OXYGEN SATURATION: 97 % | HEIGHT: 67 IN | HEART RATE: 71 BPM | DIASTOLIC BLOOD PRESSURE: 72 MMHG | WEIGHT: 166 LBS | BODY MASS INDEX: 26.06 KG/M2 | RESPIRATION RATE: 16 BRPM | SYSTOLIC BLOOD PRESSURE: 120 MMHG

## 2021-03-25 DIAGNOSIS — E03.4 HYPOTHYROIDISM DUE TO ACQUIRED ATROPHY OF THYROID: ICD-10-CM

## 2021-03-25 DIAGNOSIS — R39.11 URINARY HESITANCY: ICD-10-CM

## 2021-03-25 DIAGNOSIS — K59.01 SLOW TRANSIT CONSTIPATION: ICD-10-CM

## 2021-03-25 DIAGNOSIS — R10.84 GENERALIZED ABDOMINAL PAIN: Primary | ICD-10-CM

## 2021-03-25 DIAGNOSIS — R35.1 NOCTURIA: ICD-10-CM

## 2021-03-25 DIAGNOSIS — R10.84 GENERALIZED ABDOMINAL PAIN: ICD-10-CM

## 2021-03-25 LAB
ALBUMIN SERPL-MCNC: 3.6 G/DL (ref 3.4–5)
ALBUMIN/GLOB SERPL: 1.1 {RATIO} (ref 1–2)
ALP LIVER SERPL-CCNC: 78 U/L
ALT SERPL-CCNC: 30 U/L
ANION GAP SERPL CALC-SCNC: 4 MMOL/L (ref 0–18)
AST SERPL-CCNC: 27 U/L (ref 15–37)
BASOPHILS # BLD AUTO: 0.04 X10(3) UL (ref 0–0.2)
BASOPHILS NFR BLD AUTO: 0.8 %
BILIRUB SERPL-MCNC: 0.3 MG/DL (ref 0.1–2)
BUN BLD-MCNC: 21 MG/DL (ref 7–18)
BUN/CREAT SERPL: 20.2 (ref 10–20)
CALCIUM BLD-MCNC: 8.9 MG/DL (ref 8.5–10.1)
CHLORIDE SERPL-SCNC: 107 MMOL/L (ref 98–112)
CO2 SERPL-SCNC: 29 MMOL/L (ref 21–32)
CREAT BLD-MCNC: 1.04 MG/DL
DEPRECATED RDW RBC AUTO: 45.9 FL (ref 35.1–46.3)
EOSINOPHIL # BLD AUTO: 0.2 X10(3) UL (ref 0–0.7)
EOSINOPHIL NFR BLD AUTO: 3.8 %
ERYTHROCYTE [DISTWIDTH] IN BLOOD BY AUTOMATED COUNT: 13.4 % (ref 11–15)
GLOBULIN PLAS-MCNC: 3.2 G/DL (ref 2.8–4.4)
GLUCOSE BLD-MCNC: 87 MG/DL (ref 70–99)
HCT VFR BLD AUTO: 45.7 %
HGB BLD-MCNC: 15.1 G/DL
IMM GRANULOCYTES # BLD AUTO: 0.02 X10(3) UL (ref 0–1)
IMM GRANULOCYTES NFR BLD: 0.4 %
LYMPHOCYTES # BLD AUTO: 1.05 X10(3) UL (ref 1–4)
LYMPHOCYTES NFR BLD AUTO: 19.7 %
M PROTEIN MFR SERPL ELPH: 6.8 G/DL (ref 6.4–8.2)
MCH RBC QN AUTO: 30.8 PG (ref 26–34)
MCHC RBC AUTO-ENTMCNC: 33 G/DL (ref 31–37)
MCV RBC AUTO: 93.3 FL
MONOCYTES # BLD AUTO: 0.66 X10(3) UL (ref 0.1–1)
MONOCYTES NFR BLD AUTO: 12.4 %
NEUTROPHILS # BLD AUTO: 3.35 X10 (3) UL (ref 1.5–7.7)
NEUTROPHILS # BLD AUTO: 3.35 X10(3) UL (ref 1.5–7.7)
NEUTROPHILS NFR BLD AUTO: 62.9 %
OSMOLALITY SERPL CALC.SUM OF ELEC: 292 MOSM/KG (ref 275–295)
PATIENT FASTING Y/N/NP: YES
PLATELET # BLD AUTO: 164 10(3)UL (ref 150–450)
POTASSIUM SERPL-SCNC: 4 MMOL/L (ref 3.5–5.1)
RBC # BLD AUTO: 4.9 X10(6)UL
SODIUM SERPL-SCNC: 140 MMOL/L (ref 136–145)
T3FREE SERPL-MCNC: 2.06 PG/ML (ref 2.4–4.2)
T4 FREE SERPL-MCNC: 0.8 NG/DL (ref 0.8–1.7)
TSI SER-ACNC: 0.26 MIU/ML (ref 0.36–3.74)
WBC # BLD AUTO: 5.3 X10(3) UL (ref 4–11)

## 2021-03-25 PROCEDURE — 84439 ASSAY OF FREE THYROXINE: CPT

## 2021-03-25 PROCEDURE — 99214 OFFICE O/P EST MOD 30 MIN: CPT | Performed by: INTERNAL MEDICINE

## 2021-03-25 PROCEDURE — 84443 ASSAY THYROID STIM HORMONE: CPT

## 2021-03-25 PROCEDURE — 36415 COLL VENOUS BLD VENIPUNCTURE: CPT

## 2021-03-25 PROCEDURE — 85025 COMPLETE CBC W/AUTO DIFF WBC: CPT

## 2021-03-25 PROCEDURE — 84481 FREE ASSAY (FT-3): CPT

## 2021-03-25 PROCEDURE — 80053 COMPREHEN METABOLIC PANEL: CPT

## 2021-03-25 NOTE — PATIENT INSTRUCTIONS
BPH (Enlarged Prostate)   The prostate is a gland at the base of the bladder. As some men get older, the prostate may get bigger. This problem is called benign prostatic hyperplasia (BPH). BPH puts pressure on the urethra.  This is the tube that carries u screening is sometimes advised. This may help find the cancer in its early stages when treatment is most effective. Factors that can increase the risk of prostate cancer include being  or having a father or brother who had prostate cancer.

## 2021-03-25 NOTE — PROGRESS NOTES
HPI/Subjective:   Patient ID: Juan Pablo Mdcuffie is a 80year old male. HPI  Pt is a pleasant man that rita known for a while. 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 HE reports abd pain, generalized in nature. Feels full, distended. Constant.  Assoc with constip (two) times daily. (Patient not taking: Reported on 3/25/2021 ) 45 g 5     Allergies:No Known Allergies    Objective:   Physical Exam  Vitals and nursing note reviewed. Constitutional:       Appearance: Normal appearance. He is normal weight.    Swathi Hanna

## 2021-03-26 ENCOUNTER — TELEPHONE (OUTPATIENT)
Dept: INTERNAL MEDICINE CLINIC | Facility: CLINIC | Age: 86
End: 2021-03-26

## 2021-03-26 DIAGNOSIS — E03.4 HYPOTHYROIDISM DUE TO ACQUIRED ATROPHY OF THYROID: Primary | ICD-10-CM

## 2021-03-26 NOTE — TELEPHONE ENCOUNTER
----- Message from Lux Perrin MD sent at 3/26/2021  8:14 AM CDT -----  TSH consistent with over medication of his thyroid medication. Typically in 80year olds we do want the tsh to be low as this may put him at risk for afib. Stop liothyronine.  Cont le

## 2021-03-26 NOTE — TELEPHONE ENCOUNTER
Patient expresses understanding of lab results and processes going forward. Orders placed for f/u lab work. pt aware to d.c use of liothyronine medication .

## 2021-03-26 NOTE — TELEPHONE ENCOUNTER
Pt called in and would like to speak directly to Jj Rosario in regards to the phone call the pt received this morning (3/26/21). Pt did not want to do into detail but would like for  to call him before he leaves for the day if possible.

## 2021-04-05 ENCOUNTER — TELEPHONE (OUTPATIENT)
Dept: INTERNAL MEDICINE CLINIC | Facility: CLINIC | Age: 86
End: 2021-04-05

## 2021-04-05 ENCOUNTER — HOSPITAL ENCOUNTER (OUTPATIENT)
Dept: CT IMAGING | Facility: HOSPITAL | Age: 86
Discharge: HOME | End: 2021-04-05
Attending: INTERNAL MEDICINE
Payer: MEDICARE

## 2021-04-05 DIAGNOSIS — R10.84 GENERALIZED ABDOMINAL PAIN: ICD-10-CM

## 2021-04-05 DIAGNOSIS — K59.01 SLOW TRANSIT CONSTIPATION: ICD-10-CM

## 2021-04-05 DIAGNOSIS — N30.90 CYSTITIS: Primary | ICD-10-CM

## 2021-04-05 PROCEDURE — 74177 CT ABD & PELVIS W/CONTRAST: CPT | Performed by: INTERNAL MEDICINE

## 2021-04-05 RX ORDER — LEVOFLOXACIN 500 MG/1
500 TABLET, FILM COATED ORAL DAILY
Qty: 7 TABLET | Refills: 0 | Status: SHIPPED | OUTPATIENT
Start: 2021-04-05 | End: 2021-09-21 | Stop reason: ALTCHOICE

## 2021-04-05 NOTE — TELEPHONE ENCOUNTER
----- Message from Venkata Simpson MD sent at 4/5/2021 11:28 AM CDT -----  No acute process. No mass or inflammation  Non specific bladder thickening which may represent cystitis.  Treat with levaquin 500 mg daily x 7 days

## 2021-05-03 NOTE — H&P
HPI:     Bon Gregg is a 80year old very healthy male with a PMH of hypothyroid, JAYLIN, GERD, LBP, hemorrhoids, recurrent UTI. He presents as a consult from Dr Zac Hwang office with:  1. BPH/LUTS with h/o retention  2. OAB with nocturia  3.  BWT note underlying bladder malignancy and cystoscopy is typically recommended for this. We discussed the risks and benefits to cystoscopy and the pt would  like to do this.     For nocturia, I'd recommend the patient drink plenty of fluids first thing in the mornin [facility] SURGICAL [place], [facility]   • PATIENT [place] PREOPERATIVE ORDER FOR IV ANTIBIOTIC SURGICAL SITE INFECTION PROPHYLAXIS.   4/17/2014    Procedure: ESOPHAGOGASTRODUODENOSCOPY, POSSIBLE BIOPSY, POSSIBLE POLYPECTOMY 02725;  Surgeon: Andrei Garcia MD;  [facility] metRONIDAZOLE 0.75 % External Cream Apply 1 Application topically 2 (two) times daily. 45 g 5   • Tretinoin 0.1 % External Cream Apply 1 Application topically daily.  45 g 3   • Azelaic Acid 15 % External Gel Apply 1 Application topically 2 (two) times linn unspecified erectile dysfunction type  -     Sildenafil Citrate 100 MG Oral Tab; Take 1 tablet (100 mg total) by mouth daily as needed for Erectile Dysfunction.  Take ~ one hour prior to sexual activity on an empty stomach      - He will double or triple wa

## 2021-05-11 ENCOUNTER — OFFICE VISIT (OUTPATIENT)
Dept: SURGERY | Facility: CLINIC | Age: 86
End: 2021-05-11
Payer: MEDICARE

## 2021-05-11 VITALS — SYSTOLIC BLOOD PRESSURE: 140 MMHG | DIASTOLIC BLOOD PRESSURE: 78 MMHG

## 2021-05-11 DIAGNOSIS — R35.1 NOCTURIA: ICD-10-CM

## 2021-05-11 DIAGNOSIS — N40.1 BPH WITH OBSTRUCTION/LOWER URINARY TRACT SYMPTOMS: Primary | ICD-10-CM

## 2021-05-11 DIAGNOSIS — N13.8 BPH WITH OBSTRUCTION/LOWER URINARY TRACT SYMPTOMS: Primary | ICD-10-CM

## 2021-05-11 DIAGNOSIS — N32.89 BLADDER WALL THICKENING: ICD-10-CM

## 2021-05-11 DIAGNOSIS — N52.9 ERECTILE DYSFUNCTION, UNSPECIFIED ERECTILE DYSFUNCTION TYPE: ICD-10-CM

## 2021-05-11 DIAGNOSIS — Z12.5 SCREENING PSA (PROSTATE SPECIFIC ANTIGEN): ICD-10-CM

## 2021-05-11 PROCEDURE — 51798 US URINE CAPACITY MEASURE: CPT | Performed by: UROLOGY

## 2021-05-11 PROCEDURE — 99204 OFFICE O/P NEW MOD 45 MIN: CPT | Performed by: UROLOGY

## 2021-05-11 PROCEDURE — 81003 URINALYSIS AUTO W/O SCOPE: CPT | Performed by: UROLOGY

## 2021-05-11 RX ORDER — TAMSULOSIN HYDROCHLORIDE 0.4 MG/1
0.4 CAPSULE ORAL EVERY EVENING
Qty: 90 CAPSULE | Refills: 6 | Status: SHIPPED | OUTPATIENT
Start: 2021-05-11 | End: 2021-06-02

## 2021-05-11 RX ORDER — SILDENAFIL 100 MG/1
100 TABLET, FILM COATED ORAL
Qty: 30 TABLET | Refills: 5 | Status: SHIPPED | OUTPATIENT
Start: 2021-05-11 | End: 2021-06-02

## 2021-05-24 PROBLEM — R35.1 NOCTURIA: Status: ACTIVE | Noted: 2021-05-24

## 2021-05-24 PROBLEM — N13.8 BPH WITH OBSTRUCTION/LOWER URINARY TRACT SYMPTOMS: Status: ACTIVE | Noted: 2021-05-24

## 2021-05-24 PROBLEM — N40.1 BPH WITH OBSTRUCTION/LOWER URINARY TRACT SYMPTOMS: Status: ACTIVE | Noted: 2021-05-24

## 2021-05-24 NOTE — PROGRESS NOTES
HPI:     Daron Walden is a 80year old very healthy male with a PMH of hypothyroid, JAYLIN, GERD, LBP, hemorrhoids, recurrent UTI. Following for:  1. BPH/LUTS with h/o retention (now on flomax 5/11/21)  2. OAB with nocturia  3.  BWT noted on CT was obtained, he was placed in the supine position and prepped and draped in the usual sterile fashion using Betadine. Local anesthesia was induced by the introduction of 2% Lidocaine jelly per urethra.   A 16 Icelandic flexible scope was passed through the an hematuria: none  Tobacco hx: 20 pack years, quit 48 y ago  Kidney stone hx: none  Fam h/o  malignancy: none    He is sexually active with 50% potency. I do not have a baseline PSA for him.  We discussed the risks and benefits to PSA screening and he wo Reflux    • Sleep apnea    • Unspecified disorder of thyroid    • Visual impairment     glasses   • Wears glasses       Past Surgical History:   Procedure Laterality Date   • COLONOSCOPY  2004   • DIAGNOSTIC ANOSCOPY  2004   • HERNIA SURGERY  1998    Dereje Rivera to sexual activity on an empty stomach 30 tablet 5   • Tazarotene (TAZORAC) 0.1 % External Cream Apply 1 Application topically 2 (two) times a day. 30 g 3   • Levothyroxine Sodium 100 MCG Oral Tab Take 1 tablet (100 mcg total) by mouth daily.  90 tablet 3 breathing  ABDOMEN: soft, no obvious masses or tenderness  SKIN: no obvious rashes    : as noted above       ASSESSMENT/PLAN:   Diagnoses and all orders for this visit:    BPH with obstruction/lower urinary tract symptoms  -     URINALYSIS, AUTO, W/O SCO

## 2021-06-02 ENCOUNTER — PROCEDURE (OUTPATIENT)
Dept: SURGERY | Facility: CLINIC | Age: 86
End: 2021-06-02
Payer: MEDICARE

## 2021-06-02 VITALS — HEART RATE: 74 BPM | DIASTOLIC BLOOD PRESSURE: 70 MMHG | TEMPERATURE: 97 F | SYSTOLIC BLOOD PRESSURE: 143 MMHG

## 2021-06-02 DIAGNOSIS — N52.9 ERECTILE DYSFUNCTION, UNSPECIFIED ERECTILE DYSFUNCTION TYPE: ICD-10-CM

## 2021-06-02 DIAGNOSIS — R35.1 NOCTURIA: ICD-10-CM

## 2021-06-02 DIAGNOSIS — Z12.5 SCREENING PSA (PROSTATE SPECIFIC ANTIGEN): ICD-10-CM

## 2021-06-02 DIAGNOSIS — N32.89 BLADDER WALL THICKENING: ICD-10-CM

## 2021-06-02 DIAGNOSIS — N13.8 BPH WITH OBSTRUCTION/LOWER URINARY TRACT SYMPTOMS: Primary | ICD-10-CM

## 2021-06-02 DIAGNOSIS — N40.1 BPH WITH OBSTRUCTION/LOWER URINARY TRACT SYMPTOMS: Primary | ICD-10-CM

## 2021-06-02 PROCEDURE — 52000 CYSTOURETHROSCOPY: CPT | Performed by: UROLOGY

## 2021-06-02 PROCEDURE — 99213 OFFICE O/P EST LOW 20 MIN: CPT | Performed by: UROLOGY

## 2021-06-02 RX ORDER — SILDENAFIL 100 MG/1
100 TABLET, FILM COATED ORAL
Qty: 30 TABLET | Refills: 5 | Status: SHIPPED | OUTPATIENT
Start: 2021-06-02

## 2021-06-02 RX ORDER — CIPROFLOXACIN 500 MG/1
500 TABLET, FILM COATED ORAL ONCE
Status: COMPLETED | OUTPATIENT
Start: 2021-06-02 | End: 2021-06-02

## 2021-06-02 RX ORDER — TAMSULOSIN HYDROCHLORIDE 0.4 MG/1
0.4 CAPSULE ORAL EVERY EVENING
Qty: 90 CAPSULE | Refills: 6 | Status: SHIPPED | OUTPATIENT
Start: 2021-06-02

## 2021-06-02 RX ADMIN — CIPROFLOXACIN 500 MG: 500 TABLET, FILM COATED ORAL at 10:44:00

## 2021-06-28 ENCOUNTER — TELEPHONE (OUTPATIENT)
Dept: INTERNAL MEDICINE CLINIC | Facility: CLINIC | Age: 86
End: 2021-06-28

## 2021-06-28 DIAGNOSIS — K52.9 CHRONIC DIARRHEA: Primary | ICD-10-CM

## 2021-06-28 NOTE — TELEPHONE ENCOUNTER
Per Dr. Piedad Lund refer pt to GI for chronic diarrhea  [address]  [phone]    Pt given above contact information. Referral placed.

## 2021-09-20 ENCOUNTER — TELEPHONE (OUTPATIENT)
Dept: INTERNAL MEDICINE CLINIC | Facility: CLINIC | Age: 86
End: 2021-09-20

## 2021-09-20 NOTE — TELEPHONE ENCOUNTER
Patient had a procedure done concerning a hernia. It has created a bump that hurts.  He would like to speak with a RN

## 2021-09-21 ENCOUNTER — OFFICE VISIT (OUTPATIENT)
Dept: INTERNAL MEDICINE CLINIC | Facility: CLINIC | Age: 86
End: 2021-09-21
Payer: MEDICARE

## 2021-09-21 VITALS
HEART RATE: 67 BPM | OXYGEN SATURATION: 98 % | WEIGHT: 168 LBS | HEIGHT: 70 IN | TEMPERATURE: 98 F | RESPIRATION RATE: 16 BRPM | BODY MASS INDEX: 24.05 KG/M2 | DIASTOLIC BLOOD PRESSURE: 78 MMHG | SYSTOLIC BLOOD PRESSURE: 124 MMHG

## 2021-09-21 DIAGNOSIS — E03.4 HYPOTHYROIDISM DUE TO ACQUIRED ATROPHY OF THYROID: ICD-10-CM

## 2021-09-21 DIAGNOSIS — N40.1 BPH WITH OBSTRUCTION/LOWER URINARY TRACT SYMPTOMS: ICD-10-CM

## 2021-09-21 DIAGNOSIS — N13.8 BPH WITH OBSTRUCTION/LOWER URINARY TRACT SYMPTOMS: ICD-10-CM

## 2021-09-21 DIAGNOSIS — Z00.00 ANNUAL PHYSICAL EXAM: Primary | ICD-10-CM

## 2021-09-21 DIAGNOSIS — Z00.00 ENCOUNTER FOR ANNUAL HEALTH EXAMINATION: ICD-10-CM

## 2021-09-21 DIAGNOSIS — K21.9 GASTROESOPHAGEAL REFLUX DISEASE WITHOUT ESOPHAGITIS: ICD-10-CM

## 2021-09-21 DIAGNOSIS — Z23 NEED FOR INFLUENZA VACCINATION: ICD-10-CM

## 2021-09-21 DIAGNOSIS — Z12.5 PROSTATE CANCER SCREENING: ICD-10-CM

## 2021-09-21 DIAGNOSIS — R10.32 LEFT INGUINAL PAIN: ICD-10-CM

## 2021-09-21 PROBLEM — R35.1 NOCTURIA: Status: RESOLVED | Noted: 2021-05-24 | Resolved: 2021-09-21

## 2021-09-21 PROCEDURE — G0008 ADMIN INFLUENZA VIRUS VAC: HCPCS | Performed by: INTERNAL MEDICINE

## 2021-09-21 PROCEDURE — 90662 IIV NO PRSV INCREASED AG IM: CPT | Performed by: INTERNAL MEDICINE

## 2021-09-21 PROCEDURE — G0439 PPPS, SUBSEQ VISIT: HCPCS | Performed by: INTERNAL MEDICINE

## 2021-09-21 NOTE — PROGRESS NOTES
HPI:   [place] Whyte is a 80year old male who presents for a Medicare Subsequent Annual Wellness visit (Pt already had Initial Annual Wellness). HPI:  Here for AWV  Complaints of left inguinal bulge for 1 week when he does leg raises.   No pain or Medicine)    Patient Active Problem List:     Hypothyroidism     GERD (gastroesophageal reflux disease)     BPH with obstruction/lower urinary tract symptoms    Wt Readings from Last 3 Encounters:  09/21/21 : 168 lb (76.2 kg)  07/01/21 : 160 lb (72.6 kg) He  has a past medical history of Abdominal hernia, Actinic keratosis, Back pain, Back problem, Belching, Colon polyps (1975), Constipation, Diarrhea, unspecified, Esophageal reflux, Fainting, Fatigue, Frequent urination, Frequent UTI, Gastroenteritis, H Resp 16   Ht 5' 10\" (1.778 m)   Wt 168 lb (76.2 kg)   SpO2 98%   BMI 24.11 kg/m²   Estimated body mass index is 24.11 kg/m² as calculated from the following:    Height as of this encounter: 5' 10\" (1.778 m).     Weight as of this encounter: 168 lb (76.2 k 09/27/2018   • Fluzone Vaccine Medicare () 11/08/2017   • Influenza 11/29/2017   • Kenalog Per 10mg Inj 06/19/2012, 12/06/2012   • Pneumococcal (Prevnar 13) 02/26/2016   • Pneumovax 23 09/24/2010   Deferred Date(s) Deferred   • >=3 YRS TRI  MULTIDOSE well-being?: Visiting Friends; Visiting Family     Supplementary Documentation:   Jamila Hernandez's SCREENING SCHEDULE   Tests on this list are recommended by your physician but may not be covered, or covered at this frequency, by your insurer.    Please flu season  Please get every year 09/28/2020  No recommendations at this time    Pneumococcal Each vaccine ([id number] & [id number]) covered once after 65 Prevnar 13: 02/26/2016    [id number]: 09/24/2010     No recommendations at this time    Hepatitis B

## 2021-09-21 NOTE — PATIENT INSTRUCTIONS
Prostate Anatomy  The prostate gland is part of the male reproductive system. The prostate is located below the bladder. It surrounds the urethra, the tube that carries urine and semen out of the body. The function of the prostate is to produce fluid.  Jeff Peterson reasons 04/30/2018      Ultrasound Screening for Abdominal Aortic Aneurysm (AAA) Covered once in a lifetime for one of the following risk factors   • Men who are 73-68 years old and have ever smoked   • Anyone with a family history -     Colorectal Cancer types of Advance Directives. It also has the State forms available on it's website for anyone to review and print using their home computer and printer. (the forms are also available in [address])  [email or web address] [email or web address]. org  This link also has information from

## 2021-09-22 DIAGNOSIS — E03.4 HYPOTHYROIDISM DUE TO ACQUIRED ATROPHY OF THYROID: ICD-10-CM

## 2021-09-22 RX ORDER — LEVOTHYROXINE SODIUM 0.1 MG/1
100 TABLET ORAL DAILY
Qty: 90 TABLET | Refills: 0 | Status: SHIPPED | OUTPATIENT
Start: 2021-09-22 | End: 2021-12-07

## 2021-10-12 ENCOUNTER — TELEPHONE (OUTPATIENT)
Dept: INTERNAL MEDICINE CLINIC | Facility: CLINIC | Age: 86
End: 2021-10-12

## 2021-10-12 DIAGNOSIS — Z12.11 SCREENING FOR COLON CANCER: Primary | ICD-10-CM

## 2021-10-12 NOTE — TELEPHONE ENCOUNTER
Pt is interested in colon cancer screening kits    Please call to [facility] when they are ready for

## 2021-10-26 ENCOUNTER — LAB ENCOUNTER (OUTPATIENT)
Dept: LAB | Age: 86
End: 2021-10-26
Attending: INTERNAL MEDICINE
Payer: MEDICARE

## 2021-10-26 DIAGNOSIS — Z12.11 SCREENING FOR COLON CANCER: ICD-10-CM

## 2021-10-26 PROCEDURE — 82274 ASSAY TEST FOR BLOOD FECAL: CPT

## 2021-12-07 DIAGNOSIS — E03.4 HYPOTHYROIDISM DUE TO ACQUIRED ATROPHY OF THYROID: ICD-10-CM

## 2021-12-07 RX ORDER — LEVOTHYROXINE SODIUM 0.1 MG/1
TABLET ORAL
Qty: 90 TABLET | Refills: 0 | Status: SHIPPED | OUTPATIENT
Start: 2021-12-07 | End: 2022-01-24

## 2021-12-07 NOTE — TELEPHONE ENCOUNTER
Last time medication was refilled 9/22/21  Quantity and number of refills 90 w/ 0   Last OV 9/21/21  Next OV 3/22

## 2021-12-17 ENCOUNTER — TELEPHONE (OUTPATIENT)
Dept: INTERNAL MEDICINE CLINIC | Facility: CLINIC | Age: 86
End: 2021-12-17

## 2021-12-17 DIAGNOSIS — E03.9 ACQUIRED HYPOTHYROIDISM: ICD-10-CM

## 2021-12-17 RX ORDER — LIOTHYRONINE SODIUM 5 UG/1
TABLET ORAL
Qty: 90 TABLET | Refills: 0 | OUTPATIENT
Start: 2021-12-17 | End: 2022-01-24

## 2021-12-17 NOTE — TELEPHONE ENCOUNTER
Patient is in [place] and does not have his   Liothyronine Sodium 7.5 mcg    He is asking for a script to be faxed to him at  Fax:  [phone]    Or    Please contact the compounding pharmacy in [place].   Address: [address], [place]

## 2021-12-17 NOTE — TELEPHONE ENCOUNTER
Rx called in and given directly to pharmacist. They will contact pt once medication is ready for . Pt aware.

## 2021-12-27 LAB
AMB EXT BILIRUBIN, TOTAL: 0.7 MG/DL
AMB EXT BUN: 28 MG/DL
AMB EXT CHLORIDE: 106
AMB EXT CHOLESTEROL, TOTAL: 182 MG/DL
AMB EXT CMP ALT: 28 U/L
AMB EXT CMP AST: 24 U/L
AMB EXT CREATININE: 0.96 MG/DL
AMB EXT GLUCOSE: 111 MG/DL
AMB EXT HDL CHOLESTEROL: 45 MG/DL
AMB EXT HEMATOCRIT: 46
AMB EXT HEMOGLOBIN: 15.3
AMB EXT HGBA1C: 5.4 %
AMB EXT LDL CHOLESTEROL, DIRECT: 123 MG/DL
AMB EXT MCV: 94
AMB EXT PLATELETS: 157
AMB EXT POSTASSIUM: 4.5 MMOL/L
AMB EXT SODIUM: 143 MMOL/L
AMB EXT TOTAL PROTEIN: 6.1
AMB EXT TRIGLYCERIDES: 76 MG/DL
AMB EXT TSH: 0.14 MIU/ML
AMB EXT WBC: 4.4 X10(3)UL

## 2022-01-14 ENCOUNTER — MED REC SCAN ONLY (OUTPATIENT)
Dept: INTERNAL MEDICINE CLINIC | Facility: CLINIC | Age: 87
End: 2022-01-14

## 2022-01-17 ENCOUNTER — TELEPHONE (OUTPATIENT)
Dept: INTERNAL MEDICINE CLINIC | Facility: CLINIC | Age: 87
End: 2022-01-17

## 2022-01-17 ENCOUNTER — LAB ENCOUNTER (OUTPATIENT)
Dept: LAB | Facility: HOSPITAL | Age: 87
End: 2022-01-17
Attending: INTERNAL MEDICINE
Payer: MEDICARE

## 2022-01-17 DIAGNOSIS — Z20.822 CLOSE EXPOSURE TO COVID-19 VIRUS: ICD-10-CM

## 2022-01-17 DIAGNOSIS — R05.9 COUGH: ICD-10-CM

## 2022-01-17 DIAGNOSIS — R05.9 COUGH: Primary | ICD-10-CM

## 2022-01-17 NOTE — TELEPHONE ENCOUNTER
Symptom onset:  10 days ago  Exposure: during antwon and after christmas  Symptoms: sinus and cough  Per Dr. Isabell Mcgowan PCR.   Order placed and patient transferred to [facility]

## 2022-01-19 ENCOUNTER — TELEPHONE (OUTPATIENT)
Dept: INTERNAL MEDICINE CLINIC | Facility: CLINIC | Age: 87
End: 2022-01-19

## 2022-01-19 NOTE — TELEPHONE ENCOUNTER
Results are still in process. Pt notified of this and that we will contact him once they are in. Pt's only symptom is nasal congestion.

## 2022-01-20 PROBLEM — Z86.16 PERSONAL HISTORY OF COVID-19: Status: ACTIVE | Noted: 2022-01-20

## 2022-01-20 LAB — SARS-COV-2 RNA RESP QL NAA+PROBE: DETECTED

## 2022-01-20 RX ORDER — ZOLPIDEM TARTRATE 5 MG/1
5 TABLET ORAL NIGHTLY PRN
Qty: 5 TABLET | Refills: 0 | Status: SHIPPED | OUTPATIENT
Start: 2022-01-20 | End: 2022-01-23

## 2022-01-20 RX ORDER — PSEUDOEPHEDRINE HYDROCHLORIDE 30 MG/1
30 TABLET ORAL 2 TIMES DAILY
Qty: 6 TABLET | Refills: 0 | Status: SHIPPED | OUTPATIENT
Start: 2022-01-20 | End: 2022-01-23

## 2022-01-20 NOTE — TELEPHONE ENCOUNTER
Dr. Katharine Rose spoke with pt regarding results.   Per Dr. Katharine Rose Sudafed 30mg BID x 3 days and Ambien 5mg nightly #5     Sent to Dr. Katharine Rose for approval.

## 2022-01-20 NOTE — TELEPHONE ENCOUNTER
----- Message from Pennie Jimenez MD sent at 1/20/2022 10:32 AM CST -----  Covid positive  Place on covid protocol

## 2022-01-22 ENCOUNTER — HOSPITAL ENCOUNTER (EMERGENCY)
Facility: HOSPITAL | Age: 87
Discharge: HOME | End: 2022-01-22
Attending: EMERGENCY MEDICINE
Payer: MEDICARE

## 2022-01-22 VITALS
HEIGHT: 70 IN | RESPIRATION RATE: 16 BRPM | HEART RATE: 68 BPM | BODY MASS INDEX: 22.9 KG/M2 | TEMPERATURE: 98 F | DIASTOLIC BLOOD PRESSURE: 68 MMHG | OXYGEN SATURATION: 98 % | WEIGHT: 160 LBS | SYSTOLIC BLOOD PRESSURE: 145 MMHG

## 2022-01-22 DIAGNOSIS — G47.00 INSOMNIA, UNSPECIFIED TYPE: Primary | ICD-10-CM

## 2022-01-22 PROCEDURE — 99283 EMERGENCY DEPT VISIT LOW MDM: CPT

## 2022-01-22 RX ORDER — ZOLPIDEM TARTRATE 5 MG/1
5 TABLET ORAL ONCE
Status: COMPLETED | OUTPATIENT
Start: 2022-01-22 | End: 2022-01-22

## 2022-01-22 RX ORDER — ZOLPIDEM TARTRATE 5 MG/1
5 TABLET ORAL NIGHTLY PRN
Qty: 10 TABLET | Refills: 0 | Status: SHIPPED | OUTPATIENT
Start: 2022-01-22 | End: 2022-01-23

## 2022-01-22 NOTE — ED PROVIDER NOTES
Patient Seen in: [facility] Emergency Department      History   Patient presents with:  Fatigue  Covid    Stated Complaint: pt states he has covid and is unable to sleep    Subjective:   HPI    Patient is an 45-year-old male comes to emergency room POLYPECTOMY 13007;  Surgeon: Paulo Penaloza MD;  Location: [address] [place] [address]   • SIGMOIDOSCOPY,DIAGNOSTIC     • UPPER GI ENDO POSS BARRTTS  4/14    poss Shane's; esophagitis; HH; polyp   • UPPER GI ENDOSCOPY,BIOPSY  4/17/2014    Procedure: ES within reasonable clinical confidence and prior to discharge, that an emergency medical condition was not or was no longer present. There was no indication for further evaluation, treatment or admission on an emergency basis.   Comprehensive verbal and wri

## 2022-01-22 NOTE — ED INITIAL ASSESSMENT (HPI)
Pt presents to ED with c/o not beinf able to sleep tonight. Prt was diagnosed with COVID and is since tested negative. Pt told by PCP he might be unable to sleep for a while. Pt has been fine until tonight.  Pt came to see if he could get some sleeping pill

## 2022-01-23 ENCOUNTER — MOBILE ENCOUNTER (OUTPATIENT)
Dept: INTERNAL MEDICINE CLINIC | Facility: CLINIC | Age: 87
End: 2022-01-23

## 2022-01-23 RX ORDER — TRAZODONE HYDROCHLORIDE 50 MG/1
50 TABLET ORAL NIGHTLY PRN
Qty: 30 TABLET | Refills: 0 | Status: SHIPPED | OUTPATIENT
Start: 2022-01-23 | End: 2022-01-27

## 2022-01-23 NOTE — PROGRESS NOTES
[facility] called with complaints of insomnia anxiety. He’s going to give an Ambien with [facility] and see room. She said this make some tired . We discussed options and we decided we will try trazodone 50 mg that time as needed.  Additionally he had been get he has

## 2022-01-27 ENCOUNTER — OFFICE VISIT (OUTPATIENT)
Dept: INTERNAL MEDICINE CLINIC | Facility: CLINIC | Age: 87
End: 2022-01-27
Payer: MEDICARE

## 2022-01-27 ENCOUNTER — LAB ENCOUNTER (OUTPATIENT)
Dept: LAB | Age: 87
End: 2022-01-27
Attending: INTERNAL MEDICINE
Payer: MEDICARE

## 2022-01-27 VITALS
DIASTOLIC BLOOD PRESSURE: 74 MMHG | TEMPERATURE: 97 F | BODY MASS INDEX: 23.19 KG/M2 | RESPIRATION RATE: 12 BRPM | HEART RATE: 74 BPM | WEIGHT: 162 LBS | SYSTOLIC BLOOD PRESSURE: 132 MMHG | OXYGEN SATURATION: 97 % | HEIGHT: 70 IN

## 2022-01-27 DIAGNOSIS — K59.09 CHRONIC CONSTIPATION: ICD-10-CM

## 2022-01-27 DIAGNOSIS — F41.9 ANXIETY: ICD-10-CM

## 2022-01-27 DIAGNOSIS — E05.90 HYPERTHYROIDISM: Primary | ICD-10-CM

## 2022-01-27 DIAGNOSIS — R41.0 CONFUSION: ICD-10-CM

## 2022-01-27 PROCEDURE — 99214 OFFICE O/P EST MOD 30 MIN: CPT | Performed by: INTERNAL MEDICINE

## 2022-01-27 NOTE — PATIENT INSTRUCTIONS
Hyperthyroidism    You have been diagnosed with hyperthyroidism. This means you have a thyroid gland that makes too much thyroid hormone. This hormone helps with body growth and metabolism.  If you make too much thyroid hormone, many body processes spee care  Tips for taking medicines  · Take any medicines you’re prescribed as directed. · Take your medicine at the same times each day. · Check with your pharmacist before using over-the-counter medicines with your prescribed medicines.  This will lower the

## 2022-01-28 ENCOUNTER — TELEPHONE (OUTPATIENT)
Dept: INTERNAL MEDICINE CLINIC | Facility: CLINIC | Age: 87
End: 2022-01-28

## 2022-01-28 DIAGNOSIS — F41.9 ANXIETY: ICD-10-CM

## 2022-01-28 RX ORDER — ALPRAZOLAM 0.5 MG/1
0.5 TABLET ORAL 2 TIMES DAILY PRN
Qty: 14 TABLET | Refills: 0 | COMMUNITY
Start: 2022-01-28 | End: 2022-02-01

## 2022-01-28 NOTE — TELEPHONE ENCOUNTER
Patient wife called back to let clinical staff know that patient has decided [facility]

## 2022-01-28 NOTE — TELEPHONE ENCOUNTER
Patient spouse called in wanting to inform Brunilda Lucia that  the patient is [facility]. Patient spouse (vilma) states she is currently about to take him to get an MRI On the brain.

## 2022-01-28 NOTE — PROGRESS NOTES
Subjective:   Patient ID: Sofia Ling is a 80year old male. Anxiety  This is a new problem. Episode onset: 2-3 weeks. sxs occurred after recent COVID infection. pt had recently returned from [facility].  While there he has seen a longevity cl TRYPTOPHAN OR) Take by mouth daily. Allergies:No Known Allergies    Objective:   Physical Exam  Vitals and nursing note reviewed. Constitutional:       Appearance: Normal appearance.    Cardiovascular:      Rate and Rhythm: Normal rate and regular

## 2022-01-28 NOTE — TELEPHONE ENCOUNTER
D/w pt he can take xanax twice a day as needed. Spouse was notified of this as well. She reports pt is refusing to take xanax. She will keep us updated.

## 2022-02-01 ENCOUNTER — TELEPHONE (OUTPATIENT)
Dept: INTERNAL MEDICINE CLINIC | Facility: CLINIC | Age: 87
End: 2022-02-01

## 2022-02-01 RX ORDER — ALPRAZOLAM 0.5 MG/1
TABLET ORAL
Qty: 14 TABLET | Refills: 0 | Status: SHIPPED | OUTPATIENT
Start: 2022-02-01 | End: 2022-02-08

## 2022-02-01 NOTE — TELEPHONE ENCOUNTER
Per Dr. Hallie Ignacio refer to neurology, check TSH, b12, folate, CBC and cmp. Appt scheduled with Dr. Charlene Benavides tomorrow at 10:20am @ [facility]   Julita Garsia [address] [place] [address]  [phone]    Unable to reach pt or spouse. Dr. Hallie Ignacio will relay information to pt and spouse.

## 2022-02-02 ENCOUNTER — TELEPHONE (OUTPATIENT)
Dept: INTERNAL MEDICINE CLINIC | Facility: CLINIC | Age: 87
End: 2022-02-02

## 2022-02-02 ENCOUNTER — TELEMEDICINE (OUTPATIENT)
Dept: NEUROLOGY | Facility: CLINIC | Age: 87
End: 2022-02-02
Payer: MEDICARE

## 2022-02-02 DIAGNOSIS — R41.3 MEMORY LOSS: Primary | ICD-10-CM

## 2022-02-02 PROCEDURE — 99443 PHONE E/M BY PHYS 21-30 MIN: CPT | Performed by: OTHER

## 2022-02-02 RX ORDER — LEVOTHYROXINE SODIUM 0.03 MG/1
25 TABLET ORAL
Qty: 30 TABLET | Refills: 0 | Status: SHIPPED | OUTPATIENT
Start: 2022-02-02 | End: 2022-02-10

## 2022-02-02 NOTE — TELEPHONE ENCOUNTER
Per Dr. Millan Severe begin levothyroxine 25mcg daily and complete labs this week. D/w pt and his spouse above they expressed understanding.

## 2022-02-03 ENCOUNTER — TELEPHONE (OUTPATIENT)
Dept: INTERNAL MEDICINE CLINIC | Facility: CLINIC | Age: 87
End: 2022-02-03

## 2022-02-03 NOTE — TELEPHONE ENCOUNTER
Patient has a question regarding medication    Pt isnt sure if he needs to take 3 a day   Morning, afternoon, and evening. Or can patient take it as needed? His daughter (psychologist) wanted to make sure he is following instructions and taking it as directed. Rx directions state take as needed. Please call pt to advise.    Thank you

## 2022-02-03 NOTE — TELEPHONE ENCOUNTER
Notified patient directions for Alprazolam 0.5mg tablet is 1 tablet by mouth twice daily as needed. Informed patient he does not need to take this medication twice daily, as this is prescribed prn up to two times daily. Patient verbalized understanding.

## 2022-02-07 ENCOUNTER — TELEPHONE (OUTPATIENT)
Dept: INTERNAL MEDICINE CLINIC | Facility: CLINIC | Age: 87
End: 2022-02-07

## 2022-02-07 NOTE — TELEPHONE ENCOUNTER
Patient called in stating he needs to speak to Dr. Judy Meier at some point today. Patient stated that Dr. Judy Meier knows what it is regarding and patient did not want to call his cell phone. Please advise.

## 2022-02-08 ENCOUNTER — LAB ENCOUNTER (OUTPATIENT)
Dept: LAB | Age: 87
End: 2022-02-08
Attending: INTERNAL MEDICINE
Payer: MEDICARE

## 2022-02-08 DIAGNOSIS — E05.90 HYPERTHYROIDISM: ICD-10-CM

## 2022-02-08 DIAGNOSIS — R41.0 CONFUSION: ICD-10-CM

## 2022-02-08 LAB
ALBUMIN SERPL-MCNC: 3.4 G/DL (ref 3.4–5)
ALBUMIN/GLOB SERPL: 1.2 {RATIO} (ref 1–2)
ALP LIVER SERPL-CCNC: 68 U/L
ALT SERPL-CCNC: 31 U/L
ANION GAP SERPL CALC-SCNC: 5 MMOL/L (ref 0–18)
AST SERPL-CCNC: 25 U/L (ref 15–37)
BASOPHILS # BLD AUTO: 0.03 X10(3) UL (ref 0–0.2)
BASOPHILS NFR BLD AUTO: 0.8 %
BILIRUB SERPL-MCNC: 0.5 MG/DL (ref 0.1–2)
BUN BLD-MCNC: 15 MG/DL (ref 7–18)
CALCIUM BLD-MCNC: 9.1 MG/DL (ref 8.5–10.1)
CHLORIDE SERPL-SCNC: 104 MMOL/L (ref 98–112)
CO2 SERPL-SCNC: 28 MMOL/L (ref 21–32)
CREAT BLD-MCNC: 0.98 MG/DL
EOSINOPHIL # BLD AUTO: 0.11 X10(3) UL (ref 0–0.7)
EOSINOPHIL NFR BLD AUTO: 3 %
ERYTHROCYTE [DISTWIDTH] IN BLOOD BY AUTOMATED COUNT: 13.2 %
FASTING STATUS PATIENT QL REPORTED: NO
FOLATE SERPL-MCNC: 24.7 NG/ML (ref 8.7–?)
GLOBULIN PLAS-MCNC: 2.8 G/DL (ref 2.8–4.4)
GLUCOSE BLD-MCNC: 89 MG/DL (ref 70–99)
HCT VFR BLD AUTO: 45.6 %
HGB BLD-MCNC: 14.9 G/DL
IMM GRANULOCYTES # BLD AUTO: 0.01 X10(3) UL (ref 0–1)
IMM GRANULOCYTES NFR BLD: 0.3 %
LYMPHOCYTES # BLD AUTO: 0.74 X10(3) UL (ref 1–4)
MCH RBC QN AUTO: 30.3 PG (ref 26–34)
MCHC RBC AUTO-ENTMCNC: 32.7 G/DL (ref 31–37)
MCV RBC AUTO: 92.9 FL
MONOCYTES # BLD AUTO: 0.55 X10(3) UL (ref 0.1–1)
MONOCYTES NFR BLD AUTO: 14.8 %
NEUTROPHILS # BLD AUTO: 2.27 X10 (3) UL (ref 1.5–7.7)
NEUTROPHILS # BLD AUTO: 2.27 X10(3) UL (ref 1.5–7.7)
NEUTROPHILS NFR BLD AUTO: 61.2 %
OSMOLALITY SERPL CALC.SUM OF ELEC: 284 MOSM/KG (ref 275–295)
PLATELET # BLD AUTO: 172 10(3)UL (ref 150–450)
POTASSIUM SERPL-SCNC: 4.6 MMOL/L (ref 3.5–5.1)
PROT SERPL-MCNC: 6.2 G/DL (ref 6.4–8.2)
RBC # BLD AUTO: 4.91 X10(6)UL
SODIUM SERPL-SCNC: 137 MMOL/L (ref 136–145)
TSI SER-ACNC: 2.01 MIU/ML (ref 0.36–3.74)
VIT B12 SERPL-MCNC: 1087 PG/ML (ref 193–986)
WBC # BLD AUTO: 3.7 X10(3) UL (ref 4–11)

## 2022-02-08 PROCEDURE — 82746 ASSAY OF FOLIC ACID SERUM: CPT

## 2022-02-08 PROCEDURE — 85025 COMPLETE CBC W/AUTO DIFF WBC: CPT

## 2022-02-08 PROCEDURE — 36415 COLL VENOUS BLD VENIPUNCTURE: CPT

## 2022-02-08 PROCEDURE — 82607 VITAMIN B-12: CPT

## 2022-02-08 PROCEDURE — 80053 COMPREHEN METABOLIC PANEL: CPT

## 2022-02-08 PROCEDURE — 84443 ASSAY THYROID STIM HORMONE: CPT

## 2022-02-10 ENCOUNTER — TELEPHONE (OUTPATIENT)
Dept: INTERNAL MEDICINE CLINIC | Facility: CLINIC | Age: 87
End: 2022-02-10

## 2022-02-10 ENCOUNTER — OFFICE VISIT (OUTPATIENT)
Dept: INTERNAL MEDICINE CLINIC | Facility: CLINIC | Age: 87
End: 2022-02-10
Payer: MEDICARE

## 2022-02-10 VITALS
SYSTOLIC BLOOD PRESSURE: 126 MMHG | WEIGHT: 158 LBS | OXYGEN SATURATION: 96 % | HEART RATE: 78 BPM | RESPIRATION RATE: 16 BRPM | HEIGHT: 70 IN | BODY MASS INDEX: 22.62 KG/M2 | TEMPERATURE: 98 F | DIASTOLIC BLOOD PRESSURE: 80 MMHG

## 2022-02-10 DIAGNOSIS — F41.9 ANXIETY: ICD-10-CM

## 2022-02-10 DIAGNOSIS — S39.012A STRAIN OF LUMBAR REGION, INITIAL ENCOUNTER: Primary | ICD-10-CM

## 2022-02-10 PROBLEM — F13.20 SEDATIVE, HYPNOTIC OR ANXIOLYTIC DEPENDENCE, UNCOMPLICATED (HCC): Status: ACTIVE | Noted: 2022-02-10

## 2022-02-10 PROCEDURE — 99214 OFFICE O/P EST MOD 30 MIN: CPT | Performed by: INTERNAL MEDICINE

## 2022-02-10 RX ORDER — NAPROXEN 500 MG/1
500 TABLET ORAL 2 TIMES DAILY PRN
Qty: 14 TABLET | Refills: 0 | Status: SHIPPED | OUTPATIENT
Start: 2022-02-10 | End: 2022-02-15

## 2022-02-10 RX ORDER — LEVOTHYROXINE SODIUM 0.03 MG/1
25 TABLET ORAL
Qty: 90 TABLET | Refills: 0 | Status: SHIPPED | OUTPATIENT
Start: 2022-02-10 | End: 2022-03-17 | Stop reason: DRUGHIGH

## 2022-02-10 RX ORDER — ALPRAZOLAM 0.5 MG/1
0.5 TABLET ORAL 2 TIMES DAILY PRN
Qty: 14 TABLET | Refills: 0 | Status: SHIPPED | OUTPATIENT
Start: 2022-02-10 | End: 2022-02-15

## 2022-02-10 NOTE — TELEPHONE ENCOUNTER
Patient said he took the extra strength Excedrin and its not helping he still is in a fog and wants to know what else he can take. He took more than is directed on the package.      Please call to advise

## 2022-02-10 NOTE — TELEPHONE ENCOUNTER
Spoke with pt he reports headache and Pressure between eyes. He has taken 2 excedrin today one in the morning and another around lunch time and this hasn't helped when it typically does. Per Dr. Rossi Loyola stop Excedrin and begin Naproxen 500mg BID with meals PRN     D/w pt above he expressed understanding. Per Dr. Rossi Loyola pt can take 1 naproxen today. Pt aware.

## 2022-02-14 ENCOUNTER — TELEPHONE (OUTPATIENT)
Dept: INTERNAL MEDICINE CLINIC | Facility: CLINIC | Age: 87
End: 2022-02-14

## 2022-02-14 NOTE — TELEPHONE ENCOUNTER
Pt was given Alprazolam on 2/10/22, 14 tabs, pt should have pills through 2/16/22. Too soon to fill at this time. Ольга Vitale advised to call pt's wife and confirm how much medication pt is taking. Christopher Roy was called at [phone], no answer, no prompt to LVM, will need to f/u on 2/14/22.

## 2022-02-14 NOTE — TELEPHONE ENCOUNTER
Spoke with pt he reports he takes 1/2 tab in the AM, 1/2 tab in the afternoon and a full tablet before bedtime. Pt has 1 tablet left when he should have 4 left. Per Dr. Hector Arana no refill at this time and would like to speak with spouse Rosalia Mireles. D/w pt above. He requested an appointment tomorrow with Dr. Hector Arana. Appt scheduled.

## 2022-02-15 ENCOUNTER — TELEPHONE (OUTPATIENT)
Dept: INTERNAL MEDICINE CLINIC | Facility: CLINIC | Age: 87
End: 2022-02-15

## 2022-02-15 NOTE — TELEPHONE ENCOUNTER
Patient currently at [address]. Per patient pharmacy needs verbal OK to fill as this refill is early. Contacted pharmacy and notified per Dr. Ghada Meza OK to refill medication.

## 2022-02-15 NOTE — TELEPHONE ENCOUNTER
Patient daughter Magda Nuñez called in wanting to speak with clinical staff to give an update on her father and also discuss his health condition, patient is scheduled to come in at 11:45 however the daughter wants to speak with nurse because the patient has some memory issues.

## 2022-02-15 NOTE — TELEPHONE ENCOUNTER
Daughter Kevindong Ciera, requesting to have updated HIPPA form with both Paolo Acosta and patient's other daughter Carlos Kelly added to [facility]. Patient aware we can not share any information regarding patient until Maty Vang is added to form, patient verbalizes understanding. States she would like to make us aware of what patient would like to discuss today, in case he forgets. Patient has ongoing memory concerns and concerns with taking Xanax. Would also like to discuss his  Neuro psychology referral that was placed by Dr. Spike Quach. Notified Maty Vang we will request patient to complete new HIPPA form upon arrival to appointment.

## 2022-02-16 RX ORDER — ALPRAZOLAM 0.5 MG/1
TABLET ORAL
Qty: 14 TABLET | Refills: 0 | OUTPATIENT
Start: 2022-02-16

## 2022-02-16 RX ORDER — ALPRAZOLAM 0.5 MG/1
0.5 TABLET ORAL 2 TIMES DAILY PRN
Qty: 14 TABLET | Refills: 0 | OUTPATIENT
Start: 2022-02-16 | End: 2022-02-22

## 2022-02-16 NOTE — TELEPHONE ENCOUNTER
Spoke with pt he reports the xanax that was called in yesterday to [facility] is a different shape, not scored and difficult to cut in 1/2. It is also distributed by a different  than the xanax pt has received from [facility]. Pt took a full tablet last night and this made him sleep longer than he expected and caused a headache. He would like to return this medication and wants Xanax called into [facility] so he can receive the scored tablets he typically gets. Pt was advised to return current xanax to [facility].  He expressed understanding. Spoke with Bird Bledsoe Pharmacist @ [facility] and she was given the above scenario. Refill auth given.

## 2022-02-18 ENCOUNTER — TELEPHONE (OUTPATIENT)
Dept: INTERNAL MEDICINE CLINIC | Facility: CLINIC | Age: 87
End: 2022-02-18

## 2022-02-18 NOTE — TELEPHONE ENCOUNTER
Spoke with pt he was inquiring how he should take the naproxen as he woke up with a headache this morning and Excedrin has not helped. Pt informed he can take 1 tablet twice a day at [facility] 8-12 hours about with some type of food. He expressed understanding.

## 2022-02-18 NOTE — TELEPHONE ENCOUNTER
Patient called in stating he has a question about migraine medication. Patient unable to find name of medication. Please call back to discuss.

## 2022-02-21 ENCOUNTER — TELEPHONE (OUTPATIENT)
Dept: INTERNAL MEDICINE CLINIC | Facility: CLINIC | Age: 87
End: 2022-02-21

## 2022-02-21 RX ORDER — ALPRAZOLAM 0.5 MG/1
0.5 TABLET ORAL 2 TIMES DAILY PRN
Qty: 14 TABLET | Refills: 0 | OUTPATIENT
Start: 2022-02-21

## 2022-02-21 NOTE — TELEPHONE ENCOUNTER
Refill Req, Second Req  Original req 2/16/22    Taylor Lind  Ph: [phone]  Fx: [phone]    ALPRAZolam 0.5 MG Oral Tab

## 2022-02-21 NOTE — TELEPHONE ENCOUNTER
Pt called asking about the Naproxen 500mg, asking how often he can take it. Pt advised to take Excedrin first, if his HA is not relieved then he can take Naproxen 500mg, he can repeat the Naproxen in 8-12 hours if needed but should not exceed a total of 2 tabs daily. Pt verbalized understanding and agreed to plan.

## 2022-02-22 ENCOUNTER — TELEPHONE (OUTPATIENT)
Dept: INTERNAL MEDICINE CLINIC | Facility: CLINIC | Age: 87
End: 2022-02-22

## 2022-02-22 RX ORDER — ALPRAZOLAM 0.5 MG/1
0.5 TABLET ORAL 2 TIMES DAILY PRN
Qty: 30 TABLET | Refills: 1 | Status: SHIPPED | OUTPATIENT
Start: 2022-02-22 | End: 2022-03-03

## 2022-02-22 NOTE — TELEPHONE ENCOUNTER
Spoke with pt he is unsure if he took his lexapro today. He was inquiring if he should take another. Pt was advised not to take another dose until tomorrow. He expressed understanding.

## 2022-02-22 NOTE — TELEPHONE ENCOUNTER
Patient called in wanting to know if its okay if he takes 2 tablets by mouth daily instead of 1. Patient indicated he is feeling a little shakey.   escitalopram (LEXAPRO) 10 MG Oral Tab

## 2022-02-23 NOTE — TELEPHONE ENCOUNTER
Patient complaining of significantly decreased appetite. Per Dr. Jesika Jensen discontinue lexapro. Start Paxil 20 mg. Attempted to contact [address] [place], [place] [address] regarding above plan of care. PEDRO PABLO ENGEL. Order pending.

## 2022-02-23 NOTE — TELEPHONE ENCOUNTER
Notified patient's wife of plan of care. Diamond verbalized understanding. Medication sent to preferred pharmacy.

## 2022-02-25 ENCOUNTER — TELEPHONE (OUTPATIENT)
Dept: INTERNAL MEDICINE CLINIC | Facility: CLINIC | Age: 87
End: 2022-02-25

## 2022-02-25 NOTE — TELEPHONE ENCOUNTER
Spouse reached out to Dr. Katharine Rose regarding pt's confusion. Per Dr. Katharine Rose schedule in office appt with neurologist.  Yohannes Patel with Dr. Prashant Ceron office and appt scheduled for 3/1. Spouse aware.    [facility]  [address], [address]  [phone]

## 2022-02-26 ENCOUNTER — MOBILE ENCOUNTER (OUTPATIENT)
Dept: INTERNAL MEDICINE CLINIC | Facility: CLINIC | Age: 87
End: 2022-02-26

## 2022-02-26 RX ORDER — ESCITALOPRAM OXALATE 10 MG/1
10 TABLET ORAL DAILY
Qty: 90 TABLET | Refills: 0 | Status: SHIPPED | OUTPATIENT
Start: 2022-02-26 | End: 2022-03-01

## 2022-02-28 ENCOUNTER — TELEPHONE (OUTPATIENT)
Dept: INTERNAL MEDICINE CLINIC | Facility: CLINIC | Age: 87
End: 2022-02-28

## 2022-02-28 ENCOUNTER — OFFICE VISIT (OUTPATIENT)
Dept: INTERNAL MEDICINE CLINIC | Facility: CLINIC | Age: 87
End: 2022-02-28
Payer: MEDICARE

## 2022-02-28 VITALS
SYSTOLIC BLOOD PRESSURE: 134 MMHG | WEIGHT: 164 LBS | HEART RATE: 72 BPM | HEIGHT: 70 IN | DIASTOLIC BLOOD PRESSURE: 78 MMHG | TEMPERATURE: 98 F | OXYGEN SATURATION: 98 % | BODY MASS INDEX: 23.48 KG/M2 | RESPIRATION RATE: 16 BRPM

## 2022-02-28 DIAGNOSIS — G44.209 TENSION HEADACHE: Primary | ICD-10-CM

## 2022-02-28 DIAGNOSIS — F41.1 GAD (GENERALIZED ANXIETY DISORDER): ICD-10-CM

## 2022-02-28 PROBLEM — F41.9 ANXIETY: Status: ACTIVE | Noted: 2022-02-28

## 2022-02-28 PROCEDURE — 99214 OFFICE O/P EST MOD 30 MIN: CPT | Performed by: INTERNAL MEDICINE

## 2022-02-28 RX ORDER — NAPROXEN SODIUM 220 MG
TABLET ORAL DAILY
COMMUNITY

## 2022-02-28 RX ORDER — ESCITALOPRAM OXALATE 10 MG/1
10 TABLET ORAL DAILY
Qty: 1 TABLET | Refills: 0 | Status: SHIPPED | OUTPATIENT
Start: 2022-02-28 | End: 2022-03-14

## 2022-03-01 ENCOUNTER — OFFICE VISIT (OUTPATIENT)
Dept: NEUROLOGY | Facility: CLINIC | Age: 87
End: 2022-03-01
Payer: MEDICARE

## 2022-03-01 VITALS
DIASTOLIC BLOOD PRESSURE: 64 MMHG | WEIGHT: 160 LBS | HEART RATE: 69 BPM | RESPIRATION RATE: 17 BRPM | SYSTOLIC BLOOD PRESSURE: 124 MMHG | BODY MASS INDEX: 23 KG/M2

## 2022-03-01 DIAGNOSIS — R41.3 MEMORY LOSS: Primary | ICD-10-CM

## 2022-03-01 PROCEDURE — 99214 OFFICE O/P EST MOD 30 MIN: CPT | Performed by: OTHER

## 2022-03-01 RX ORDER — ACETAMINOPHEN, ASPIRIN AND CAFFEINE 250; 250; 65 MG/1; MG/1; MG/1
1 TABLET, FILM COATED ORAL EVERY 6 HOURS PRN
COMMUNITY

## 2022-03-01 NOTE — PROGRESS NOTES
Patient's spouse contacted Dr. Prince Dorado office to schedule a follow up visit due to increased confusion. Patient's spouse reports patient has daily headaches. Patient's spouse reports increased confusion and memory changes over the last year.

## 2022-03-02 ENCOUNTER — TELEPHONE (OUTPATIENT)
Dept: NEUROLOGY | Facility: CLINIC | Age: 87
End: 2022-03-02

## 2022-03-02 NOTE — TELEPHONE ENCOUNTER
RN attempted to call patient's spouse back. Phone rang over 2 minutes, no answer, no VM    Per discussion with Dr. Charlene Benavides. Patient needs to have neuropsychological testing. Prior appt,, patient reported he was unwilling to take test, after 3/1/2022 appt, patient reported he would complete the testing.   Dr. Red Stuart needs patient to be willing to take test.

## 2022-03-02 NOTE — TELEPHONE ENCOUNTER
Pt's wife Sam Churchill) is calling on behalf of the pt. Jacinda Toscano states Dr. Barry Garcia referred pt for Neuropsych eval, but Jacinda Toscano states she is confused as to if the pt should keep the appointment for the neuropsych eval or not. Jacinda Toscano states she just has a few questions that need answering so she can get a better understanding of how things are going to work.  Jacinda Toscano can be reached at [phone]

## 2022-03-03 RX ORDER — ALPRAZOLAM 0.5 MG/1
0.5 TABLET ORAL 2 TIMES DAILY PRN
Qty: 30 TABLET | Refills: 0 | Status: SHIPPED | OUTPATIENT
Start: 2022-03-03 | End: 2022-03-08

## 2022-03-03 NOTE — TELEPHONE ENCOUNTER
Pt contacted Dr. Raffi Harris requesting refill on xanax. Pt should have 1 more refill on file. 15 day supply dispensed on 2/22/22. Pt should have enough medication through 3/8/22. Not due for refill until 3/9/22. CHAN TORIBIOOB.

## 2022-03-03 NOTE — TELEPHONE ENCOUNTER
Per Dr. Judy Meier okay to send refill of Xanax 0.5mg tablets. Patient requesting refill be sent to [facility] on [address]. Sent to Dr. Judy Meier for approval. Patient notified.

## 2022-03-08 RX ORDER — ALPRAZOLAM 0.5 MG/1
0.5 TABLET ORAL 3 TIMES DAILY PRN
Qty: 60 TABLET | Refills: 0 | Status: SHIPPED | OUTPATIENT
Start: 2022-03-13

## 2022-03-08 NOTE — TELEPHONE ENCOUNTER
Per Dr. Ghada Meza ok to change directions for Xanax 0.5mg to TID #60 until assessed by psychiatry (currently has appt 4/5). Can be filled on 3/13/22. D/w pt and spouse. They expressed understanding.      Sent to Dr. Ghada Meza for approval.

## 2022-03-14 ENCOUNTER — TELEPHONE (OUTPATIENT)
Dept: INTERNAL MEDICINE CLINIC | Facility: CLINIC | Age: 87
End: 2022-03-14

## 2022-03-14 RX ORDER — ESCITALOPRAM OXALATE 10 MG/1
10 TABLET ORAL DAILY
Qty: 30 TABLET | Refills: 2 | Status: SHIPPED | OUTPATIENT
Start: 2022-03-14

## 2022-03-14 NOTE — TELEPHONE ENCOUNTER
Spoke with pt as escitalopram was dispensed on 2/26 #90. Pt states he took has last pill today. Ok per Dr. Aníbal Perez to refill.  Sent to [facility] [address] per his request.

## 2022-03-16 NOTE — TELEPHONE ENCOUNTER
Appt confirmed tomorrow with Dr. Kade Spaulding at [facility] by PSR's and by RN.

## 2022-03-16 NOTE — TELEPHONE ENCOUNTER
Again attempted to call pt's wife, phone rang continuously with no option for VM. Can relay importance of test to pt's wife if/when she calls back.

## 2022-03-17 ENCOUNTER — TELEPHONE (OUTPATIENT)
Dept: INTERNAL MEDICINE CLINIC | Facility: CLINIC | Age: 87
End: 2022-03-17

## 2022-03-17 ENCOUNTER — OFFICE VISIT (OUTPATIENT)
Dept: INTERNAL MEDICINE CLINIC | Facility: CLINIC | Age: 87
End: 2022-03-17
Payer: MEDICARE

## 2022-03-17 VITALS
DIASTOLIC BLOOD PRESSURE: 70 MMHG | HEIGHT: 70 IN | BODY MASS INDEX: 23.19 KG/M2 | TEMPERATURE: 98 F | SYSTOLIC BLOOD PRESSURE: 132 MMHG | RESPIRATION RATE: 16 BRPM | WEIGHT: 162 LBS | OXYGEN SATURATION: 99 % | HEART RATE: 78 BPM

## 2022-03-17 DIAGNOSIS — E03.8 SUBCLINICAL HYPOTHYROIDISM: Primary | ICD-10-CM

## 2022-03-17 DIAGNOSIS — F41.9 ANXIETY: ICD-10-CM

## 2022-03-17 DIAGNOSIS — R53.83 FATIGUE, UNSPECIFIED TYPE: ICD-10-CM

## 2022-03-17 DIAGNOSIS — K42.9 UMBILICAL HERNIA WITHOUT OBSTRUCTION AND WITHOUT GANGRENE: ICD-10-CM

## 2022-03-17 PROBLEM — F41.1 GAD (GENERALIZED ANXIETY DISORDER): Status: ACTIVE | Noted: 2022-03-17

## 2022-03-17 PROCEDURE — 99214 OFFICE O/P EST MOD 30 MIN: CPT | Performed by: INTERNAL MEDICINE

## 2022-03-17 RX ORDER — LEVOTHYROXINE SODIUM 0.05 MG/1
50 TABLET ORAL
Qty: 90 TABLET | Refills: 1 | Status: SHIPPED | OUTPATIENT
Start: 2022-03-17

## 2022-03-17 NOTE — TELEPHONE ENCOUNTER
Spoke with spouse Dr. Mecca Wynne psychiatry appt was  cx as neurologist wanted pt to see Dr. Red Stuart and this appointment is scheduled for 5/3/22. Per Dr. Hallie Ignacio pt can keep appt with Dr. Red Stuart but needs to r/s appt with psychiatry as they will help with getting pt's anxiety controlled and pt will be weaned off of the xanax. D/w spouse above she expressed understanding.

## 2022-03-25 ENCOUNTER — OFFICE VISIT (OUTPATIENT)
Dept: SURGERY | Facility: CLINIC | Age: 87
End: 2022-03-25
Payer: MEDICARE

## 2022-03-25 VITALS
HEART RATE: 73 BPM | BODY MASS INDEX: 23.19 KG/M2 | WEIGHT: 162 LBS | DIASTOLIC BLOOD PRESSURE: 87 MMHG | SYSTOLIC BLOOD PRESSURE: 160 MMHG | HEIGHT: 70 IN | TEMPERATURE: 97 F

## 2022-03-25 DIAGNOSIS — R10.31 RIGHT GROIN PAIN: Primary | ICD-10-CM

## 2022-03-25 DIAGNOSIS — K43.2 INCISIONAL HERNIA, WITHOUT OBSTRUCTION OR GANGRENE: ICD-10-CM

## 2022-03-25 DIAGNOSIS — R22.41 LOCALIZED SWELLING, MASS AND LUMP, RIGHT LOWER LIMB: ICD-10-CM

## 2022-03-25 PROCEDURE — 99215 OFFICE O/P EST HI 40 MIN: CPT | Performed by: SURGERY

## 2022-03-28 ENCOUNTER — TELEPHONE (OUTPATIENT)
Dept: INTERNAL MEDICINE CLINIC | Facility: CLINIC | Age: 87
End: 2022-03-28

## 2022-03-28 NOTE — TELEPHONE ENCOUNTER
Liothyronine 7.5mcg SR capsules  1 capsule by mouth 3 times daily    [address] [phone], [phone]

## 2022-03-29 ENCOUNTER — TELEPHONE (OUTPATIENT)
Dept: INTERNAL MEDICINE CLINIC | Facility: CLINIC | Age: 87
End: 2022-03-29

## 2022-03-29 RX ORDER — LIOTHYRONINE SODIUM 5 UG/1
7.5 TABLET ORAL 3 TIMES DAILY
Qty: 90 TABLET | Refills: 1 | COMMUNITY
Start: 2022-03-29 | End: 2022-03-30

## 2022-03-29 NOTE — TELEPHONE ENCOUNTER
Spoke with Pharmacy tech at Pharmacy  Stated pt is requesting refill request on his Liothyronine  Spoke with Dr. Villela Found ok given to refill Liothyronine from Dr. Villela Found refill given for Liothyronine 7.5 mcg 90 tablets with 1 refill

## 2022-03-30 ENCOUNTER — TELEPHONE (OUTPATIENT)
Dept: SURGERY | Facility: CLINIC | Age: 87
End: 2022-03-30

## 2022-03-30 ENCOUNTER — HOSPITAL ENCOUNTER (OUTPATIENT)
Dept: ULTRASOUND IMAGING | Facility: HOSPITAL | Age: 87
Discharge: HOME | End: 2022-03-30
Attending: SURGERY
Payer: MEDICARE

## 2022-03-30 DIAGNOSIS — R22.41 LOCALIZED SWELLING, MASS AND LUMP, RIGHT LOWER LIMB: ICD-10-CM

## 2022-03-30 DIAGNOSIS — R10.31 RIGHT GROIN PAIN: ICD-10-CM

## 2022-03-30 PROCEDURE — 76882 US LMTD JT/FCL EVL NVASC XTR: CPT | Performed by: SURGERY

## 2022-03-30 RX ORDER — LIOTHYRONINE SODIUM 5 UG/1
7.5 TABLET ORAL 3 TIMES DAILY
Qty: 135 TABLET | Refills: 1 | Status: SHIPPED | OUTPATIENT
Start: 2022-03-30

## 2022-03-30 NOTE — TELEPHONE ENCOUNTER
I called the pt. He would like to discuss nocturia. I scheduled him for a f/u with [facility]LD to discuss. Pt verbalized understanding and all questions were answered.

## 2022-03-30 NOTE — TELEPHONE ENCOUNTER
Spoke to pt and wanted to send his liothyronine to local compound pharmacy [facility]  Sent electronically

## 2022-04-06 ENCOUNTER — OFFICE VISIT (OUTPATIENT)
Dept: SURGERY | Facility: CLINIC | Age: 87
End: 2022-04-06
Payer: MEDICARE

## 2022-04-06 DIAGNOSIS — R82.90 URINE FINDING: Primary | ICD-10-CM

## 2022-04-06 LAB
APPEARANCE: CLEAR
BILIRUBIN: NEGATIVE
GLUCOSE (URINE DIPSTICK): NEGATIVE MG/DL
KETONES (URINE DIPSTICK): NEGATIVE MG/DL
LEUKOCYTES: NEGATIVE
MULTISTIX LOT#: ABNORMAL NUMERIC
NITRITE, URINE: NEGATIVE
PH, URINE: 5.5 (ref 4.5–8)
PROTEIN (URINE DIPSTICK): NEGATIVE MG/DL
SPECIFIC GRAVITY: 1.02 (ref 1–1.03)
URINE-COLOR: YELLOW
UROBILINOGEN,SEMI-QN: 0.2 MG/DL (ref 0–1.9)

## 2022-04-06 PROCEDURE — 81003 URINALYSIS AUTO W/O SCOPE: CPT | Performed by: PHYSICIAN ASSISTANT

## 2022-04-07 RX ORDER — ALPRAZOLAM 0.5 MG/1
0.5 TABLET ORAL 3 TIMES DAILY PRN
Qty: 60 TABLET | Refills: 0 | Status: SHIPPED | OUTPATIENT
Start: 2022-04-07 | End: 2022-04-11

## 2022-04-07 NOTE — TELEPHONE ENCOUNTER
Refill Req    ALPRAZolam 0.5 MG Oral Tab    escitalopram (LEXAPRO) 10 MG Oral Tab    [facility], [facility]

## 2022-04-07 NOTE — TELEPHONE ENCOUNTER
Spoke with pt informing him he should have enough escitalopram. He will look at home for the remaining medication. Refill for xanax sent to Dr. Michelle Malave.

## 2022-04-11 ENCOUNTER — TELEPHONE (OUTPATIENT)
Dept: INTERNAL MEDICINE CLINIC | Facility: CLINIC | Age: 87
End: 2022-04-11

## 2022-04-11 RX ORDER — ESCITALOPRAM OXALATE 20 MG/1
20 TABLET ORAL DAILY
Qty: 30 TABLET | Refills: 5 | Status: SHIPPED | OUTPATIENT
Start: 2022-04-11

## 2022-04-11 RX ORDER — ESCITALOPRAM OXALATE 10 MG/1
10 TABLET ORAL DAILY
Qty: 1 TABLET | Refills: 0 | Status: SHIPPED | OUTPATIENT
Start: 2022-04-11

## 2022-04-11 NOTE — TELEPHONE ENCOUNTER
Pt walked in to office he has stopped taking his xanax and today is the 4th day. He typically takes lexapro in the morning. He is noticing around noon he becomes shaky. Inquiring if his lexapro should be increased. Per Dr. Jesika Jensen increase Lexapro to 20mg once daily. D/w pt above. He expressed understanding. He does not have the 10mg on him and would like 1 tablet sent to [place] so he can take it today and then will pick 20mg and begin that tomorrow. Rx sent, spoke with pharmacy and cx Xanax refill that was sent 4/7.

## 2022-04-13 ENCOUNTER — TELEPHONE (OUTPATIENT)
Dept: INTERNAL MEDICINE CLINIC | Facility: CLINIC | Age: 87
End: 2022-04-13

## 2022-04-13 RX ORDER — TRAZODONE HYDROCHLORIDE 50 MG/1
50 TABLET ORAL NIGHTLY
COMMUNITY

## 2022-04-13 RX ORDER — ESCITALOPRAM OXALATE 10 MG/1
10 TABLET ORAL DAILY
COMMUNITY

## 2022-04-13 NOTE — TELEPHONE ENCOUNTER
Patient requesting to stop Lexapro and start alternative medication for MARY. Per Dr. Edmund Brown discontinue Lexapro. Patient to begin Zoloft 50 mg daily. Med rx sent to pharmacy. Contacted pharmacist- medication will be ready today for .

## 2022-04-16 ENCOUNTER — LAB ENCOUNTER (OUTPATIENT)
Dept: LAB | Facility: HOSPITAL | Age: 87
End: 2022-04-16
Attending: SURGERY
Payer: MEDICARE

## 2022-04-16 DIAGNOSIS — Z20.822 ENCOUNTER FOR PREOPERATIVE SCREENING LABORATORY TESTING FOR COVID-19 VIRUS: ICD-10-CM

## 2022-04-16 DIAGNOSIS — Z01.812 ENCOUNTER FOR PREOPERATIVE SCREENING LABORATORY TESTING FOR COVID-19 VIRUS: ICD-10-CM

## 2022-04-16 LAB — SARS-COV-2 RNA RESP QL NAA+PROBE: NOT DETECTED

## 2022-04-18 ENCOUNTER — ANESTHESIA (OUTPATIENT)
Dept: SURGERY | Facility: HOSPITAL | Age: 87
End: 2022-04-18
Payer: MEDICARE

## 2022-04-18 ENCOUNTER — HOSPITAL ENCOUNTER (OUTPATIENT)
Facility: HOSPITAL | Age: 87
Setting detail: HOSPITAL OUTPATIENT SURGERY
Discharge: HOME | End: 2022-04-18
Attending: SURGERY | Admitting: SURGERY
Payer: MEDICARE

## 2022-04-18 ENCOUNTER — ANESTHESIA EVENT (OUTPATIENT)
Dept: SURGERY | Facility: HOSPITAL | Age: 87
End: 2022-04-18
Payer: MEDICARE

## 2022-04-18 VITALS
TEMPERATURE: 98 F | HEART RATE: 71 BPM | HEIGHT: 71 IN | RESPIRATION RATE: 18 BRPM | SYSTOLIC BLOOD PRESSURE: 137 MMHG | BODY MASS INDEX: 21.97 KG/M2 | OXYGEN SATURATION: 97 % | DIASTOLIC BLOOD PRESSURE: 64 MMHG | WEIGHT: 156.94 LBS

## 2022-04-18 DIAGNOSIS — Z20.822 ENCOUNTER FOR PREOPERATIVE SCREENING LABORATORY TESTING FOR COVID-19 VIRUS: Primary | ICD-10-CM

## 2022-04-18 DIAGNOSIS — Z01.812 ENCOUNTER FOR PREOPERATIVE SCREENING LABORATORY TESTING FOR COVID-19 VIRUS: Primary | ICD-10-CM

## 2022-04-18 DIAGNOSIS — K42.9 UMBILICAL HERNIA WITHOUT OBSTRUCTION AND WITHOUT GANGRENE: ICD-10-CM

## 2022-04-18 PROCEDURE — 0WQF0ZZ REPAIR ABDOMINAL WALL, OPEN APPROACH: ICD-10-PCS | Performed by: SURGERY

## 2022-04-18 RX ORDER — ACETAMINOPHEN 500 MG
1000 TABLET ORAL ONCE
Status: DISCONTINUED | OUTPATIENT
Start: 2022-04-18 | End: 2022-04-18 | Stop reason: HOSPADM

## 2022-04-18 RX ORDER — ACETAMINOPHEN 500 MG
1000 TABLET ORAL ONCE
COMMUNITY

## 2022-04-18 RX ORDER — MIDAZOLAM HYDROCHLORIDE 1 MG/ML
1 INJECTION INTRAMUSCULAR; INTRAVENOUS EVERY 5 MIN PRN
Status: DISCONTINUED | OUTPATIENT
Start: 2022-04-18 | End: 2022-04-18

## 2022-04-18 RX ORDER — HYDROMORPHONE HYDROCHLORIDE 1 MG/ML
0.5 INJECTION, SOLUTION INTRAMUSCULAR; INTRAVENOUS; SUBCUTANEOUS EVERY 5 MIN PRN
Status: DISCONTINUED | OUTPATIENT
Start: 2022-04-18 | End: 2022-04-18

## 2022-04-18 RX ORDER — ONDANSETRON 2 MG/ML
4 INJECTION INTRAMUSCULAR; INTRAVENOUS AS NEEDED
Status: DISCONTINUED | OUTPATIENT
Start: 2022-04-18 | End: 2022-04-18

## 2022-04-18 RX ORDER — HYDROCODONE BITARTRATE AND ACETAMINOPHEN 5; 325 MG/1; MG/1
2 TABLET ORAL AS NEEDED
Status: DISCONTINUED | OUTPATIENT
Start: 2022-04-18 | End: 2022-04-18

## 2022-04-18 RX ORDER — DEXAMETHASONE SODIUM PHOSPHATE 4 MG/ML
VIAL (ML) INJECTION AS NEEDED
Status: DISCONTINUED | OUTPATIENT
Start: 2022-04-18 | End: 2022-04-18 | Stop reason: SURG

## 2022-04-18 RX ORDER — BUPIVACAINE HYDROCHLORIDE 5 MG/ML
INJECTION, SOLUTION EPIDURAL; INTRACAUDAL AS NEEDED
Status: DISCONTINUED | OUTPATIENT
Start: 2022-04-18 | End: 2022-04-18 | Stop reason: HOSPADM

## 2022-04-18 RX ORDER — DEXAMETHASONE SODIUM PHOSPHATE 4 MG/ML
4 VIAL (ML) INJECTION AS NEEDED
Status: DISCONTINUED | OUTPATIENT
Start: 2022-04-18 | End: 2022-04-18

## 2022-04-18 RX ORDER — METOCLOPRAMIDE HYDROCHLORIDE 5 MG/ML
10 INJECTION INTRAMUSCULAR; INTRAVENOUS AS NEEDED
Status: DISCONTINUED | OUTPATIENT
Start: 2022-04-18 | End: 2022-04-18

## 2022-04-18 RX ORDER — DIPHENHYDRAMINE HYDROCHLORIDE 50 MG/ML
12.5 INJECTION INTRAMUSCULAR; INTRAVENOUS AS NEEDED
Status: DISCONTINUED | OUTPATIENT
Start: 2022-04-18 | End: 2022-04-18

## 2022-04-18 RX ORDER — ONDANSETRON 2 MG/ML
INJECTION INTRAMUSCULAR; INTRAVENOUS AS NEEDED
Status: DISCONTINUED | OUTPATIENT
Start: 2022-04-18 | End: 2022-04-18 | Stop reason: SURG

## 2022-04-18 RX ORDER — ACETAMINOPHEN 500 MG
1000 TABLET ORAL ONCE AS NEEDED
Status: DISCONTINUED | OUTPATIENT
Start: 2022-04-18 | End: 2022-04-18

## 2022-04-18 RX ORDER — LABETALOL HYDROCHLORIDE 5 MG/ML
5 INJECTION, SOLUTION INTRAVENOUS EVERY 5 MIN PRN
Status: DISCONTINUED | OUTPATIENT
Start: 2022-04-18 | End: 2022-04-18

## 2022-04-18 RX ORDER — KETOROLAC TROMETHAMINE 30 MG/ML
INJECTION, SOLUTION INTRAMUSCULAR; INTRAVENOUS AS NEEDED
Status: DISCONTINUED | OUTPATIENT
Start: 2022-04-18 | End: 2022-04-18 | Stop reason: SURG

## 2022-04-18 RX ORDER — CEFAZOLIN SODIUM/WATER 2 G/20 ML
2 SYRINGE (ML) INTRAVENOUS ONCE
Status: COMPLETED | OUTPATIENT
Start: 2022-04-18 | End: 2022-04-18

## 2022-04-18 RX ORDER — CEFAZOLIN SODIUM/WATER 2 G/20 ML
SYRINGE (ML) INTRAVENOUS
Status: DISCONTINUED
Start: 2022-04-18 | End: 2022-04-18

## 2022-04-18 RX ORDER — HYDROCODONE BITARTRATE AND ACETAMINOPHEN 5; 325 MG/1; MG/1
1 TABLET ORAL AS NEEDED
Status: DISCONTINUED | OUTPATIENT
Start: 2022-04-18 | End: 2022-04-18

## 2022-04-18 RX ORDER — SODIUM CHLORIDE, SODIUM LACTATE, POTASSIUM CHLORIDE, CALCIUM CHLORIDE 600; 310; 30; 20 MG/100ML; MG/100ML; MG/100ML; MG/100ML
INJECTION, SOLUTION INTRAVENOUS CONTINUOUS
Status: DISCONTINUED | OUTPATIENT
Start: 2022-04-18 | End: 2022-04-18

## 2022-04-18 RX ORDER — MEPERIDINE HYDROCHLORIDE 25 MG/ML
12.5 INJECTION INTRAMUSCULAR; INTRAVENOUS; SUBCUTANEOUS AS NEEDED
Status: DISCONTINUED | OUTPATIENT
Start: 2022-04-18 | End: 2022-04-18

## 2022-04-18 RX ORDER — EPHEDRINE SULFATE 50 MG/ML
INJECTION INTRAVENOUS AS NEEDED
Status: DISCONTINUED | OUTPATIENT
Start: 2022-04-18 | End: 2022-04-18 | Stop reason: SURG

## 2022-04-18 RX ORDER — LIDOCAINE HYDROCHLORIDE 10 MG/ML
INJECTION, SOLUTION EPIDURAL; INFILTRATION; INTRACAUDAL; PERINEURAL AS NEEDED
Status: DISCONTINUED | OUTPATIENT
Start: 2022-04-18 | End: 2022-04-18 | Stop reason: SURG

## 2022-04-18 RX ORDER — NALOXONE HYDROCHLORIDE 0.4 MG/ML
80 INJECTION, SOLUTION INTRAMUSCULAR; INTRAVENOUS; SUBCUTANEOUS AS NEEDED
Status: DISCONTINUED | OUTPATIENT
Start: 2022-04-18 | End: 2022-04-18

## 2022-04-18 RX ADMIN — ONDANSETRON 4 MG: 2 INJECTION INTRAMUSCULAR; INTRAVENOUS at 11:22:00

## 2022-04-18 RX ADMIN — CEFAZOLIN SODIUM/WATER 2 G: 2 G/20 ML SYRINGE (ML) INTRAVENOUS at 11:17:00

## 2022-04-18 RX ADMIN — EPHEDRINE SULFATE 10 MG: 50 INJECTION INTRAVENOUS at 11:34:00

## 2022-04-18 RX ADMIN — SODIUM CHLORIDE, SODIUM LACTATE, POTASSIUM CHLORIDE, CALCIUM CHLORIDE: 600; 310; 30; 20 INJECTION, SOLUTION INTRAVENOUS at 11:08:00

## 2022-04-18 RX ADMIN — LIDOCAINE HYDROCHLORIDE 5 ML: 10 INJECTION, SOLUTION EPIDURAL; INFILTRATION; INTRACAUDAL; PERINEURAL at 11:12:00

## 2022-04-18 RX ADMIN — SODIUM CHLORIDE, SODIUM LACTATE, POTASSIUM CHLORIDE, CALCIUM CHLORIDE: 600; 310; 30; 20 INJECTION, SOLUTION INTRAVENOUS at 11:54:00

## 2022-04-18 RX ADMIN — KETOROLAC TROMETHAMINE 30 MG: 30 INJECTION, SOLUTION INTRAMUSCULAR; INTRAVENOUS at 11:46:00

## 2022-04-18 RX ADMIN — DEXAMETHASONE SODIUM PHOSPHATE 4 MG: 4 MG/ML VIAL (ML) INJECTION at 11:16:00

## 2022-04-18 RX ADMIN — EPHEDRINE SULFATE 10 MG: 50 INJECTION INTRAVENOUS at 11:24:00

## 2022-04-18 NOTE — INTERVAL H&P NOTE
Pre-op Diagnosis: Umbilical hernia without obstruction and without gangrene [K42.9]    The above referenced H&P was reviewed by Faviola Ortiz MD on 4/18/2022, the patient was examined and no significant changes have occurred in the patient's condition since the H&P was performed. I discussed with the patient and/or legal representative the potential benefits, risks and side effects of this procedure; the likelihood of the patient achieving goals; and potential problems that might occur during recuperation. I discussed reasonable alternatives to the procedure, including risks, benefits and side effects related to the alternatives and risks related to not receiving this procedure. We will proceed with procedure as planned.

## 2022-04-18 NOTE — OPERATIVE REPORT
[facility]    PATIENT'S NAME: Kacie Martino   ATTENDING PHYSICIAN: Terrie Peter M.D. OPERATING PHYSICIAN: Terrie Peter M.D. PATIENT ACCOUNT#:   [de-identified]    LOCATION:  [facility] 7 EDWP 10  MEDICAL RECORD #:   [id number]       YOB: 1932  ADMISSION DATE:       04/18/2022      OPERATION DATE:  04/18/2022    OPERATIVE REPORT    PREOPERATIVE DIAGNOSIS:  Umbilical hernia. POSTOPERATIVE DIAGNOSIS:  Umbilical hernia. PROCEDURE:  Repair of umbilical hernia. ASSISTANT:  Grupo Phillips PA-C. Her assistance was necessary for prepping, draping, retracting, and suturing. ANESTHESIA:  General.    OPERATIVE TECHNIQUE:  The patient was brought in the operating room, placed on the operating table in supine position. Inhalation anesthesia provided by the attending anesthesiologist.  The abdomen was prepped using sterile fashion. Then, 1% Lidocaine, 0.5% Marcaine was used as a local anesthetic. I made a skin incision just above the umbilicus 9 to 3 through 12. I elevated the umbilicus off of the hernia sac. The hernia sac was reduced in the preperitoneal space. The hole itself was rather small, approximately 1 cm in diameter, and the fascial edges felt strong. We felt that the hole was too small for mesh and therefore I did a primary repair with interrupted 0 Ethibond sutures. Hemostasis was good. The wound was irrigated with Aricept. I did a layered wound closure with a 3-0 and a 4-0 Vicryl. Steri-Strips, sterile dressing were provided. The patient tolerated the procedure well. He was taken recovery area for observation.     Dictated By Terrie Peter M.D.  d: 04/18/2022 11:46:24  t: 04/18/2022 16:45:10  [facility] [id number]  AEZ/

## 2022-04-18 NOTE — BRIEF OP NOTE
Pre-Operative Diagnosis: Umbilical hernia without obstruction and without gangrene [K42.9]     Post-Operative Diagnosis: Umbilical hernia without obstruction and without gangrene [K42.9]      Procedure Performed:   Repair umbilical hernia    Surgeon(s) and Role:     * Terrell Quinn MD - Primary    Assistant(s):  Surgical Assistant.: Omero Espinal          Estimated Blood Loss: Blood Output: 2 mL (4/18/2022 11:44 AM)          Keon Zhao MD  4/18/2022  11:46 AM

## 2022-04-18 NOTE — ANESTHESIA PROCEDURE NOTES
Airway  Date/Time: 4/18/2022 11:14 AM  Urgency: elective      General Information and Staff    Patient location during procedure: OR  Anesthesiologist: Eric Kahn MD  Performed: anesthesiologist     Indications and Patient Condition  Indications for airway management: anesthesia  Spontaneous Ventilation: absent  Sedation level: deep  Preoxygenated: yes  Patient position: sniffing  Mask difficulty assessment: 0 - not attempted    Final Airway Details  Final airway type: supraglottic airway      Successful airway: classic  Size 3      Number of attempts at approach: 1

## 2022-05-27 ENCOUNTER — LAB ENCOUNTER (OUTPATIENT)
Dept: LAB | Age: 87
End: 2022-05-27
Attending: UROLOGY
Payer: MEDICARE

## 2022-05-27 ENCOUNTER — OFFICE VISIT (OUTPATIENT)
Dept: SURGERY | Facility: CLINIC | Age: 87
End: 2022-05-27
Payer: MEDICARE

## 2022-05-27 DIAGNOSIS — Z12.5 SCREENING PSA (PROSTATE SPECIFIC ANTIGEN): ICD-10-CM

## 2022-05-27 DIAGNOSIS — N40.1 BPH WITH OBSTRUCTION/LOWER URINARY TRACT SYMPTOMS: Primary | ICD-10-CM

## 2022-05-27 DIAGNOSIS — N52.9 ERECTILE DYSFUNCTION, UNSPECIFIED ERECTILE DYSFUNCTION TYPE: ICD-10-CM

## 2022-05-27 DIAGNOSIS — R06.83 LOUD SNORING: ICD-10-CM

## 2022-05-27 DIAGNOSIS — N32.89 BLADDER WALL THICKENING: ICD-10-CM

## 2022-05-27 DIAGNOSIS — R35.1 NOCTURIA: ICD-10-CM

## 2022-05-27 DIAGNOSIS — N13.8 BPH WITH OBSTRUCTION/LOWER URINARY TRACT SYMPTOMS: Primary | ICD-10-CM

## 2022-05-27 LAB
APPEARANCE: CLEAR
BILIRUBIN: NEGATIVE
COMPLEXED PSA SERPL-MCNC: 0.76 NG/ML (ref ?–4)
GLUCOSE (URINE DIPSTICK): NEGATIVE MG/DL
KETONES (URINE DIPSTICK): NEGATIVE MG/DL
LEUKOCYTES: NEGATIVE
MULTISTIX LOT#: ABNORMAL NUMERIC
NITRITE, URINE: NEGATIVE
PH, URINE: 5 (ref 4.5–8)
PROTEIN (URINE DIPSTICK): NEGATIVE MG/DL
SPECIFIC GRAVITY: 1.02 (ref 1–1.03)
URINE-COLOR: YELLOW
UROBILINOGEN,SEMI-QN: 0.2 MG/DL (ref 0–1.9)

## 2022-05-27 PROCEDURE — 81003 URINALYSIS AUTO W/O SCOPE: CPT | Performed by: UROLOGY

## 2022-05-27 PROCEDURE — 36415 COLL VENOUS BLD VENIPUNCTURE: CPT

## 2022-05-27 PROCEDURE — 99214 OFFICE O/P EST MOD 30 MIN: CPT | Performed by: UROLOGY

## 2022-05-27 RX ORDER — TAMSULOSIN HYDROCHLORIDE 0.4 MG/1
0.4 CAPSULE ORAL EVERY EVENING
Qty: 90 CAPSULE | Refills: 6 | Status: SHIPPED | OUTPATIENT
Start: 2022-05-27

## 2022-05-27 NOTE — PATIENT INSTRUCTIONS
Ways to Reduce Nocturia (voiding frequently at night):    1. Try to drink plenty of water first thing in the morning. Avoid drinking anything at least 2-3 hours before bedtime. Unless you are on a fluid-restriction we typically recommend drinking 40-60 ounces water per day. 2. Avoid/limit dietary irritants such as alcohol, juice, sugary things, citrus fruits, soda, carbonated beverages, coffee (including decaf), tea, spicy foods. 3. Try to exercise at least 3 times per week for at least 30 minutes at a time. We recommend cardiovascular exercise such as jogging, elliptical, biking, speed-walking. 4. Go to bed around the same time every night. This helps maintain normal nightly levels of ADH and melatonin - both of which are needed for a good night's sleep. 5. Practice good sleep hygiene - Try to only use your bed for sleeping and sex. You should specifically try to avoid doing the following in bed: eating/drinking, reading, watching TV, or using your laptop/phone. 6. If you take a diuretic, you may benefit from taking this in the morning rather than the evening. Talk to your PCP if you do take a diuretic at night to see if you can switch. 7. If you take NSAIDs (such as motrin, aleve, ibuprofen, naproxen) you may benefit from taking this at night rather than the morning. These medications tell your kidneys not to work as hard for a few hours. I would NOT recommend taking extra doses of these medications just to sleep better at night. 8. Fluid accumulation in the lower extremities that gets reabsorbed into the circulation at night is another cause for nocturnal polyuria. If you accumulate fluid in your legs we recommend you try to ambulate/move around as much as safely possible and avoid prolonged sitting. If you are sitting we would also recommend you elevate your legs to prevent fluid from accumulating.

## 2022-06-30 ENCOUNTER — TELEPHONE (OUTPATIENT)
Dept: INTERNAL MEDICINE CLINIC | Facility: CLINIC | Age: 87
End: 2022-06-30

## 2022-06-30 DIAGNOSIS — Z01.89 ENCOUNTER FOR NEUROPSYCHOLOGICAL TESTING: Primary | ICD-10-CM

## 2022-06-30 NOTE — TELEPHONE ENCOUNTER
[facility], [address] [facility] [id number] [facility] [address] [facility]     [facility]. Called patient to advise of this. Left detailed message w/ this info.

## 2022-06-30 NOTE — TELEPHONE ENCOUNTER
ADMISSION HISTORY AND PHYSICAL  [facility]    Patient: Meena Fulton Todays Date: 8/27/2019   YOB: 1937 Date of Admission: 8/26/2019   MR Number: [id number] Attending Physician: Zoie Rivera MD     Primary Care Provider:  Shayne Bustamante MD      Code Status:  Confirmed with patient and she wishes for  Selective Treatment/DNR         Consult orders:    IP CONSULT TO GENERAL SURGERY Dr. Curt Guido    IMPRESSION AND PLAN:  Principal Problem:    Urinary tract infection without hematuria  Active Problems:    Essential hypertension    GERD (gastroesophageal reflux disease)    HLD (hyperlipidemia)    Hypothyroidism due to acquired atrophy of thyroid    Coronary artery disease involving native coronary artery of native heart without angina pectoris    S/P CABG x 4    Body mass index (BMI) 40.0-44.9, adult (CMS/HCC)    Elevated liver enzymes    Elevated bilirubin    Weakness generalized    Hypoxia    Demand ischemia of myocardium (CMS/HCC)    Solitary pulmonary nodule    Hypokalemia    Acute on chronic diastolic (congestive) heart failure (CMS/HCC)      1. Acute UTI. Patient meets sepsis but not severe sepsis criteria. She started on Zosyn. Await culture results. Gentle IV fluid hydration and due to exacerbation of diastolic congestive heart failure.  2. Obstructive jaundice with elevation of liver function tests. Right upper quadrant abdominal pain onset 2 days ago, improved after several hours but still present on exam with gallbladder stones and sludge but no findings of acute cholecystitis. Patient possibly has choledocholithiasis even though her CBD duct is in of normal size. Patient's case was discussed with surgeon on call Dr. Guido who will see patient in consultation and hope will contact also on pressor draw just Dr. Nieto for potential ERCP in the morning. Patient will be n.p.o. her. Will be covered with IV Zosyn for antibiotic coverage. We will repeat CMP  Pt is requesting testing for alzheimer's     Please call back to discuss options for screening / testing in the morning. I will also will check acute hepatitis panel. I will hold Tylenol and statin from home medication list.  3. Demand myocardial ischemia and the patient with known coronary artery disease. I feel this is due to hypoxia and also due to sepsis. Will be on oxygen supplementation. We'll continue with outpatient medications and these include aspirin Plavix beta blocker, I will hold her Lipitor due to #2. Telemetry monitoring. We'll tend troponin. Echocardiogram in the morning as well.  4. Acute on chronic diastolic congestive heart failure. Elevated BNP, patient is short of breath and hypoxic and CT of the chest shows ground glass opacities. Will be on IV furosemide, will continue with outpatient dose of beta blocker. She has allergy to ACE inhibitor. Echocardiogram in the morning. We'll tend troponin and telemetry monitoring. We'll consider cardiology consultation if not improving as expected.  5. Acute hypoxic respiratory failure. Pulse ox on presentation was 86% on room air, responded to position of 2 L of oxygen by nasal cannula. I feel this is multifactorial and is due to sepsis and also due to congestive heart failure exacerbation. Management as above.  6. Hypokalemia. Magnesium level is normal. I will order potassium and magnesium supplementation protocol.  7. Solitary pulmonary nodule as incidental finding on CT of the chest. Patient is a former smoker. Nodule is 4 mm in diameter. We'll defer to primary care provider to follow up if deemed to be appropriate.  8. Generalized weakness. Consult PT.  9. Hypothyroidism. Outpatient dose of levothyroxin will be continued. TSH was checked on 8/14/19 and was 0.317, slightly below normal range. Current outpatient dose will be continued. TSH will have to be monitored closely by primary care provider on outpatient basis.  10. Hypertension. Outpatient medications will be continued.  11. Hyperlipidemia. Statin will be on hold due to elevation of liver function  tests.  12. Gastroesophageal reflux disease. We'll continue with outpatient dose of PPI.  13. Obesity. BMI 38.86. Certainly contributing factor to above issues.  14. Anemia, chronic. Hemoglobin currently stable. Don't see prior iron studies. I will order serum iron, TIBC, vitamin B12 and folic acid levels. We'll check CBC in the morning again.  15. DVT Prophylaxis-SCDs and TEDs          Admission requirements and anticipated length of stay:  The patient is expected to require a two midnight stay in the hospital.  Admission is required to provide service and treatment only available in the hospital.  Admission is supported by the followin.  The documented complex medical factors and comorbidities.  2.  The severity of signs and symptoms.  3.  The current and anticipated ongoing medical needs.  4.  The potential risk for an adverse event or outcome.      Chief complaint: Weakness, shortness of breath.    HPI:  Meena Fulton is a 82 year old female who presents for further evaluation and management of above chief complaint.  Patient reports that she has been short of breath with cough productive of white sputum for past month ever since she was started on isosorbide. 2 days ago after eating lunch she developed the right upper quadrant abdominal pain which she describes as severe, constant, stabbing, lasted for several hours and then subsided. That was on . On Monday she felt very weak all day and daughter noted mild jaundice. Patient continued to complain of her shortness of breath and cough. Patient was brought to emergency room for evaluation. She was found to be hypoxic on presentation with pulse ox of 86% on room air, responded to application of 2 L of oxygen. She was noted to be mildly jaundiced, and she didn't have elevation of liver function tests and elevated bilirubin on CMP and had leukocytosis on CBC. She has chronic anemia. Chest x-ray was unremarkable. Troponin was 0.12, rechecked 1 was again  0.12. Patient never had chest pain. ECG shows sinus rhythm, no ischemic changes. Right upper quadrant ultrasound showed presence of gallbladder stones and sludge but no findings of acute cholecystitis and normal common bile duct. Due to hypoxia patient underwent CT of the chest which was negative for PE, it showed them very mild groundglass opacities suggestive of for fluid overload. Patient was initiated on Zosyn for antibiotic coverage. She also was noted to have potassium of 2.5 and received 1 L of fluid with potassium chloride in emergency room department. Surgeon on call Dr. Guido was contacted, he will see patient in consultation, and he will be contacting also gastroenterologist Dr. Nieto for potential ERCP. Patient subsequently is admitted. Patient also was noted to have urinary tract infection. On further questioning she admits to mild dysuria symptoms such as burning and urgency on urination for couple of days.      Past Medical History:   Diagnosis Date   • ACE-inhibitor cough 8/20/2013   • Arthritis    • ASHD (arteriosclerotic heart disease)    • Chronic pain    • DDD (degenerative disc disease), lumbosacral 5/18/2015    left    • Depression    • Diverticulosis 8/20/2013   • Essential hypertension    • GERD (gastroesophageal reflux disease)    • H/O hernia repair 8/20/2013   • HLD (hyperlipidemia)    • Hypothyroidism due to acquired atrophy of thyroid 8/9/2013   • MI (myocardial infarction) (CMS/[facility])     non-ST elevation 05/2006   • Obesity    • Primary osteoarthritis of right hip 4/6/2017   • Radial fracture 07/08/2011    left wrist comminuted distal radius fx.   • S/P CABG x 4 11/20/2017     Past Surgical History:   Procedure Laterality Date   • Cabg, vein, four     • Colectomy      diverticulitis   • Colonoscopy diagnostic  01/11/2006    Colonoscopy, Dx   • Coronary angioplasty with stent placement  05/27/2006    RCA   • Left heart cath,percutaneous  01/26/2010   • [id number] ----------------------------------     Axis (Degrees) 73     R Axis (Degrees) 63     T Axis (NATALIA TRAN MD ([id number]),  Violetta Verma ([id number]) Zoie Rivera MD  Hospitalist  [facility]  8/27/2019  2:10 AM

## 2022-07-01 ENCOUNTER — TELEPHONE (OUTPATIENT)
Dept: INTERNAL MEDICINE CLINIC | Facility: CLINIC | Age: 87
End: 2022-07-01

## 2022-07-01 NOTE — TELEPHONE ENCOUNTER
Spoke with pt he had cognitive testing with a psychiatrist all testing was normal but he could not remember the 5 words that was asked of him to remember after all testing was completed. That provider recommended he start aricept. Pt is not comfortable taking this medication. He would like another consult with neuropsych. Referral was given to him yesterday. He would also like a blood test to determine if he has Alzheimer's. Pt aware we will reach out with neurology department to see if this type of testing exists and f/u with him next week. He expressed understanding.

## 2022-08-17 ENCOUNTER — TELEPHONE (OUTPATIENT)
Dept: INTERNAL MEDICINE CLINIC | Facility: CLINIC | Age: 87
End: 2022-08-17

## 2022-08-17 DIAGNOSIS — E03.8 SUBCLINICAL HYPOTHYROIDISM: ICD-10-CM

## 2022-08-17 DIAGNOSIS — E03.9 ACQUIRED HYPOTHYROIDISM: ICD-10-CM

## 2022-08-17 DIAGNOSIS — F41.1 GAD (GENERALIZED ANXIETY DISORDER): ICD-10-CM

## 2022-08-18 RX ORDER — LEVOTHYROXINE SODIUM 0.05 MG/1
50 TABLET ORAL
Qty: 90 TABLET | Refills: 1 | Status: SHIPPED | OUTPATIENT
Start: 2022-08-18 | End: 2022-08-18

## 2022-08-18 RX ORDER — LIOTHYRONINE SODIUM 5 UG/1
7.5 TABLET ORAL 3 TIMES DAILY
Qty: 135 TABLET | Refills: 1 | Status: SHIPPED | OUTPATIENT
Start: 2022-08-18 | End: 2022-08-18

## 2022-08-18 RX ORDER — LEVOTHYROXINE SODIUM 0.05 MG/1
50 TABLET ORAL
Qty: 90 TABLET | Refills: 3 | Status: SHIPPED | OUTPATIENT
Start: 2022-08-18

## 2022-08-18 RX ORDER — LIOTHYRONINE SODIUM 5 UG/1
7.5 TABLET ORAL 3 TIMES DAILY
Qty: 135 TABLET | Refills: 3 | Status: SHIPPED | OUTPATIENT
Start: 2022-08-18

## 2022-08-18 NOTE — TELEPHONE ENCOUNTER
Spoke with pt he needs levothyroxine and sertraline sent to [facility] and liothyronine sent to [facility]. Ok per Dr. Jersey Giang and refills sent.

## 2022-08-28 DIAGNOSIS — E03.8 SUBCLINICAL HYPOTHYROIDISM: ICD-10-CM

## 2022-08-29 RX ORDER — LEVOTHYROXINE SODIUM 0.05 MG/1
TABLET ORAL
Qty: 90 TABLET | Refills: 3 | OUTPATIENT
Start: 2022-08-29

## 2022-09-15 DIAGNOSIS — E03.9 ACQUIRED HYPOTHYROIDISM: ICD-10-CM

## 2022-09-15 RX ORDER — LIOTHYRONINE SODIUM 5 UG/1
7.5 TABLET ORAL 3 TIMES DAILY
Qty: 135 TABLET | Refills: 3 | Status: SHIPPED | OUTPATIENT
Start: 2022-09-15

## 2022-09-15 NOTE — TELEPHONE ENCOUNTER
Spoke with pt, Rx was discontinued due to pt had confusion but he has stopped taking Xanax which is more addictive and mainly caused confusion    Pt wants to try trazodone again for his sleep and if confusion develop then will discontinue    Last time medication was refilled 1/23/22  Quantity and # of refills 30/0  Last OV 3/17/22  Next OV none scheduled    Rx pended for approval

## 2022-09-15 NOTE — TELEPHONE ENCOUNTER
Refill Req    [facility] Rx# [id number]   96 cap    traZODone 50 MG Oral Tab    From [facility] pharmacy  #[phone]

## 2022-10-03 ENCOUNTER — PATIENT MESSAGE (OUTPATIENT)
Dept: SURGERY | Facility: CLINIC | Age: 87
End: 2022-10-03

## 2022-10-10 NOTE — TELEPHONE ENCOUNTER
Per Dr. César Patino stop Trazodone and begin Benadryl 25mg nightly PRN. Rx sent. Dr. César Patino d/w pt above as well.

## 2022-10-12 NOTE — TELEPHONE ENCOUNTER
Called [facility] pharmacy- phone in script for Alprazolam. Spoke with/ Becky Oropeza and information given. Script called in.

## 2022-10-12 NOTE — TELEPHONE ENCOUNTER
Pt reached out to Dr. Ghada Meza and benadryl is not helping with sleep. Per Dr. Ghada Meza Xanax 0.5mg one tablet at bedtime as needed for sleep #30, Seroquel 25mg one tablet at bedtime #30. Dr. Ghada Meza informed pt. Seroquel sent.

## 2022-11-03 ENCOUNTER — TELEPHONE (OUTPATIENT)
Dept: SURGERY | Facility: CLINIC | Age: 87
End: 2022-11-03

## 2022-11-03 DIAGNOSIS — R06.83 SNORING: Primary | ICD-10-CM

## 2022-11-03 DIAGNOSIS — R35.1 NOCTURIA: ICD-10-CM

## 2022-11-03 DIAGNOSIS — R53.83 OTHER FATIGUE: ICD-10-CM

## 2022-11-03 NOTE — TELEPHONE ENCOUNTER
Per pt was given order for at home sleep study, pt would like to do sleep study at the hospital instead, was told new order needs to be placed. Please advise thank you.

## 2022-11-07 ENCOUNTER — OFFICE VISIT (OUTPATIENT)
Dept: INTERNAL MEDICINE CLINIC | Facility: CLINIC | Age: 87
End: 2022-11-07
Payer: MEDICARE

## 2022-11-07 ENCOUNTER — TELEPHONE (OUTPATIENT)
Dept: INTERNAL MEDICINE CLINIC | Facility: CLINIC | Age: 87
End: 2022-11-07

## 2022-11-07 VITALS
HEART RATE: 73 BPM | BODY MASS INDEX: 22.68 KG/M2 | OXYGEN SATURATION: 98 % | SYSTOLIC BLOOD PRESSURE: 130 MMHG | WEIGHT: 162 LBS | HEIGHT: 71 IN | RESPIRATION RATE: 16 BRPM | DIASTOLIC BLOOD PRESSURE: 68 MMHG | TEMPERATURE: 98 F

## 2022-11-07 DIAGNOSIS — Z00.00 ANNUAL PHYSICAL EXAM: Primary | ICD-10-CM

## 2022-11-07 DIAGNOSIS — K21.9 GASTROESOPHAGEAL REFLUX DISEASE WITHOUT ESOPHAGITIS: ICD-10-CM

## 2022-11-07 DIAGNOSIS — R00.2 PALPITATIONS: ICD-10-CM

## 2022-11-07 DIAGNOSIS — N40.1 BPH WITH OBSTRUCTION/LOWER URINARY TRACT SYMPTOMS: ICD-10-CM

## 2022-11-07 DIAGNOSIS — F41.9 ANXIETY: ICD-10-CM

## 2022-11-07 DIAGNOSIS — F41.1 GAD (GENERALIZED ANXIETY DISORDER): ICD-10-CM

## 2022-11-07 DIAGNOSIS — Z00.00 ENCOUNTER FOR ANNUAL HEALTH EXAMINATION: ICD-10-CM

## 2022-11-07 DIAGNOSIS — N13.8 BPH WITH OBSTRUCTION/LOWER URINARY TRACT SYMPTOMS: ICD-10-CM

## 2022-11-07 DIAGNOSIS — G31.84 MILD COGNITIVE IMPAIRMENT: ICD-10-CM

## 2022-11-07 DIAGNOSIS — F13.20 SEDATIVE, HYPNOTIC OR ANXIOLYTIC DEPENDENCE, UNCOMPLICATED (HCC): ICD-10-CM

## 2022-11-07 DIAGNOSIS — K42.9 UMBILICAL HERNIA WITHOUT OBSTRUCTION AND WITHOUT GANGRENE: ICD-10-CM

## 2022-11-07 DIAGNOSIS — K59.09 CHRONIC CONSTIPATION: ICD-10-CM

## 2022-11-07 DIAGNOSIS — E03.8 SUBCLINICAL HYPOTHYROIDISM: ICD-10-CM

## 2022-11-07 PROBLEM — R41.3 MEMORY LOSS: Status: RESOLVED | Noted: 2022-02-02 | Resolved: 2022-11-07

## 2022-11-14 DIAGNOSIS — F41.1 GAD (GENERALIZED ANXIETY DISORDER): ICD-10-CM

## 2022-11-14 DIAGNOSIS — F51.01 PRIMARY INSOMNIA: ICD-10-CM

## 2022-11-14 DIAGNOSIS — F51.04 PSYCHOPHYSIOLOGICAL INSOMNIA: ICD-10-CM

## 2022-11-14 RX ORDER — TRAZODONE HYDROCHLORIDE 50 MG/1
TABLET ORAL
Qty: 30 TABLET | Refills: 0 | OUTPATIENT
Start: 2022-11-14

## 2022-11-14 RX ORDER — ALPRAZOLAM 0.5 MG/1
TABLET ORAL
Qty: 30 TABLET | Refills: 0 | Status: SHIPPED | OUTPATIENT
Start: 2022-11-14

## 2022-11-14 NOTE — TELEPHONE ENCOUNTER
Last time medication was refilled 10/12/22  Quantity and # of refills  30 tabs w/0 refills   Last OV  11/7/22  Next OV   Future Appointments   Date Time Provider Maude Walker   12/1/2022  9:00 PM SCHEDULE BY DATE SLP Amrit Cardenas   6/5/2023  4:15 PM Carlos Barnes MD [facility]     None scheduled  Per protocol to provider

## 2022-11-24 DIAGNOSIS — F51.01 PRIMARY INSOMNIA: ICD-10-CM

## 2022-11-24 RX ORDER — TRAZODONE HYDROCHLORIDE 50 MG/1
50 TABLET ORAL NIGHTLY
Qty: 90 TABLET | Refills: 0 | Status: SHIPPED | OUTPATIENT
Start: 2022-11-24 | End: 2022-11-25

## 2022-11-25 DIAGNOSIS — F51.01 PRIMARY INSOMNIA: ICD-10-CM

## 2022-11-25 RX ORDER — TRAZODONE HYDROCHLORIDE 50 MG/1
50 TABLET ORAL NIGHTLY
Qty: 90 TABLET | Refills: 0 | Status: SHIPPED | OUTPATIENT
Start: 2022-11-25

## 2022-11-30 ENCOUNTER — TELEPHONE (OUTPATIENT)
Dept: SLEEP CENTER | Age: 87
End: 2022-11-30

## 2022-12-01 ENCOUNTER — OFFICE VISIT (OUTPATIENT)
Dept: SLEEP CENTER | Age: 87
End: 2022-12-01
Attending: UROLOGY
Payer: MEDICARE

## 2022-12-01 DIAGNOSIS — R06.83 LOUD SNORING: ICD-10-CM

## 2022-12-01 PROCEDURE — 95810 POLYSOM 6/> YRS 4/> PARAM: CPT

## 2022-12-09 ENCOUNTER — SLEEP STUDY (OUTPATIENT)
Facility: CLINIC | Age: 87
End: 2022-12-09
Payer: MEDICARE

## 2022-12-09 DIAGNOSIS — G47.33 OSA (OBSTRUCTIVE SLEEP APNEA): Primary | ICD-10-CM

## 2022-12-09 PROCEDURE — 95810 POLYSOM 6/> YRS 4/> PARAM: CPT | Performed by: OTHER

## 2022-12-22 ENCOUNTER — TELEPHONE (OUTPATIENT)
Facility: CLINIC | Age: 87
End: 2022-12-22

## 2022-12-22 NOTE — TELEPHONE ENCOUNTER
Pt completed Diagnostic Sleep Study and is welcomed to follow-up with Dr. Joel Koch for his results, no JAYLIN noted. Per chart notes, LVM for \"Diamond\" at home number and also called for \"Antonella\" at alternate number to offer an appointment.

## 2022-12-23 DIAGNOSIS — F51.04 PSYCHOPHYSIOLOGICAL INSOMNIA: ICD-10-CM

## 2022-12-23 DIAGNOSIS — F41.1 GAD (GENERALIZED ANXIETY DISORDER): ICD-10-CM

## 2022-12-23 RX ORDER — QUETIAPINE FUMARATE 25 MG/1
25 TABLET, FILM COATED ORAL NIGHTLY
Qty: 90 TABLET | Refills: 0 | Status: SHIPPED | OUTPATIENT
Start: 2022-12-23

## 2023-02-14 ENCOUNTER — OFFICE VISIT (OUTPATIENT)
Facility: CLINIC | Age: 88
End: 2023-02-14
Payer: MEDICARE

## 2023-02-14 VITALS
RESPIRATION RATE: 16 BRPM | WEIGHT: 167 LBS | OXYGEN SATURATION: 97 % | SYSTOLIC BLOOD PRESSURE: 136 MMHG | HEART RATE: 78 BPM | HEIGHT: 71 IN | BODY MASS INDEX: 23.38 KG/M2 | DIASTOLIC BLOOD PRESSURE: 70 MMHG

## 2023-02-14 DIAGNOSIS — R06.83 SNORING: Primary | ICD-10-CM

## 2023-02-14 DIAGNOSIS — G47.09 OTHER INSOMNIA: ICD-10-CM

## 2023-02-14 PROCEDURE — 99204 OFFICE O/P NEW MOD 45 MIN: CPT | Performed by: OTHER

## 2023-02-14 NOTE — PATIENT INSTRUCTIONS
How to avoid insomnia  Wake up at the same time each day. Maintaining a regular sleep schedule is important to good sleep. Eliminate alcohol and stimulants like nicotine and caffeine. The effects of caffeine can last for several hours, perhaps up to 24 hours, so the chances of it affecting sleep are significant. Caffeine may not only cause difficulty initiating sleep, but may also cause frequent awakenings. Alcohol may have a sedative effect for the first few hours following consumption, but it can then lead to frequent arousals and a non-restful night's sleep. Medications that act as stimulants, such as decongestants  can also disrupt your sleep and should be avoided. Use of technology such as computers, media and smart phones prior to bedtime should be avoided. Do not look at the clock. Clock watching has been associated with increased anxiety during sleep and worsening of insomnia. The bright lights from modern digital clocks have been associated with poor sleep. Clocks should be turned away from you and if the light is too bright, they should be covered. Eliminate naps. While napping seems like a proper way to catch up on missed sleep, it is not always so. It is important to establish and maintain a regular sleep pattern and train oneself to associate sleep with cues like darkness and a consistent bedtime. Napping can affect the quality of nighttime sleep. If you must nap, do not nap past 1pm and no longer than 45min-1 hour. Exercise regularly. Regular exercise can improve sleep quality and duration. However, exercising immediately before bedtime can have a stimulant effect on the body and should be avoided. Try to finish exercising at least three hours before you plan to retire for the night. Limit activities in bed. The bed is for sleeping and having sex and that's it. Do not use the computer or smart phone, watch TV, catch up on work or listen to music while in bed.  All these activities can increase alertness and make it difficult to fall asleep. Do not eat or drink right before going to bed. Eating a late dinner or snacking before going to bed can activate the digestive system and keep you up. Drinking a lot of fluids prior to bed can overwhelm the bladder, requiring frequent visits to the bathroom that disturb your sleep. Make your sleeping environment comfortable. Temperature, lighting, and noise should be controlled to make the bedroom conducive to falling (and staying) asleep. Ideally your bedroom temperature should be 66-68 degrees. Rooms that are warm will make it more difficult to fall asleep. Do not allow your pet to sleep on your bed or in the bedroom. If your bed partner is disruptive due to snoring or constant movement, consider sleeping alone in a separate bedroom. Get all your worrying over with before you go to bed. If you find you lay in bed thinking about tomorrow, consider setting aside a period of time -- perhaps after dinner -- to review the day and to make plans for the next day. The goal is to avoid doing these things while trying to fall asleep. It is also useful to make a list of, say, work-related tasks for the next day before leaving work. That, at least, eliminates one set of concerns. Reduce stress. There are a number of relaxation therapies and stress reduction methods you may want to try to relax the mind and the body before going to bed. Examples include progressive muscle relaxation (perhaps with audio tapes), deep breathing techniques, imagery, and meditation.

## 2023-03-20 DIAGNOSIS — F51.04 PSYCHOPHYSIOLOGICAL INSOMNIA: ICD-10-CM

## 2023-03-20 DIAGNOSIS — F41.1 GAD (GENERALIZED ANXIETY DISORDER): ICD-10-CM

## 2023-03-20 RX ORDER — QUETIAPINE FUMARATE 25 MG/1
25 TABLET, FILM COATED ORAL NIGHTLY
Qty: 30 TABLET | Refills: 0 | Status: SHIPPED | OUTPATIENT
Start: 2023-03-20

## 2023-03-20 NOTE — TELEPHONE ENCOUNTER
QUEtiapine (SEROQUEL) 25 MG Oral Tab  Last time medication was refilled 2/26/23  Quantity and # of refills 30 tab  Last OV 11/7/22  Next OV no appt scheduled

## 2023-03-20 NOTE — TELEPHONE ENCOUNTER
Refill Req    Patient out of medication today    QUEtiapine (SEROQUEL) 25 MG Oral Tab    Please send to [facility]  On file    Last Office Visit 11/7/22

## 2023-04-10 NOTE — TELEPHONE ENCOUNTER
Refill Req     Please revise dosage so pt can take 1-1/2 pills    Taking 1 before bed and  1/2 pill in the middle of the night when he goes to the bathroom in the middle of the night.      QUEtiapine (SEROQUEL) 25 MG Oral Tab    90 day supply if available     [facility] [id number] - [address] [phone], [phone]    Last OV 11/7/22  Next OV not scheduled yet

## 2023-04-10 NOTE — TELEPHONE ENCOUNTER
Spoke with pt, stated he takes Seroquel 25 mg at bedtime and at the middel of the night he take another 1/2 tablet   Verified the dosage of 25 mg at bedtime plus another 12.5 mg additionally  Advised we will send 37.5 mg at bedtime an he will have to separate the dosage  Understanding verbalilzed  Rx pended for 37.5 mg at bedtime for approval

## 2023-06-05 ENCOUNTER — OFFICE VISIT (OUTPATIENT)
Dept: SURGERY | Facility: CLINIC | Age: 88
End: 2023-06-05

## 2023-06-05 DIAGNOSIS — R06.83 SNORING: ICD-10-CM

## 2023-06-05 DIAGNOSIS — N40.1 BPH WITH OBSTRUCTION/LOWER URINARY TRACT SYMPTOMS: Primary | ICD-10-CM

## 2023-06-05 DIAGNOSIS — N52.9 ERECTILE DYSFUNCTION, UNSPECIFIED ERECTILE DYSFUNCTION TYPE: ICD-10-CM

## 2023-06-05 DIAGNOSIS — R35.1 NOCTURIA: ICD-10-CM

## 2023-06-05 DIAGNOSIS — N13.8 BPH WITH OBSTRUCTION/LOWER URINARY TRACT SYMPTOMS: Primary | ICD-10-CM

## 2023-06-05 PROCEDURE — 81003 URINALYSIS AUTO W/O SCOPE: CPT | Performed by: UROLOGY

## 2023-06-05 PROCEDURE — 99214 OFFICE O/P EST MOD 30 MIN: CPT | Performed by: UROLOGY

## 2023-06-05 RX ORDER — TAMSULOSIN HYDROCHLORIDE 0.4 MG/1
0.4 CAPSULE ORAL EVERY EVENING
Qty: 90 CAPSULE | Refills: 6 | Status: SHIPPED | OUTPATIENT
Start: 2023-06-05

## 2023-08-29 DIAGNOSIS — E03.9 ACQUIRED HYPOTHYROIDISM: ICD-10-CM

## 2023-08-30 DIAGNOSIS — N40.1 BPH WITH OBSTRUCTION/LOWER URINARY TRACT SYMPTOMS: ICD-10-CM

## 2023-08-30 DIAGNOSIS — N13.8 BPH WITH OBSTRUCTION/LOWER URINARY TRACT SYMPTOMS: ICD-10-CM

## 2023-08-30 RX ORDER — TAMSULOSIN HYDROCHLORIDE 0.4 MG/1
0.4 CAPSULE ORAL
Qty: 90 CAPSULE | Refills: 6 | Status: SHIPPED | OUTPATIENT
Start: 2023-08-30

## 2023-08-30 RX ORDER — LIOTHYRONINE SODIUM 5 UG/1
7.5 TABLET ORAL 3 TIMES DAILY
Qty: 135 TABLET | Refills: 0 | Status: SHIPPED | OUTPATIENT
Start: 2023-08-30

## 2023-10-23 ENCOUNTER — TELEPHONE (OUTPATIENT)
Facility: CLINIC | Age: 88
End: 2023-10-23

## 2023-10-23 ENCOUNTER — OFFICE VISIT (OUTPATIENT)
Facility: CLINIC | Age: 88
End: 2023-10-23
Payer: MEDICARE

## 2023-10-23 VITALS
OXYGEN SATURATION: 97 % | RESPIRATION RATE: 16 BRPM | HEIGHT: 71 IN | HEART RATE: 78 BPM | DIASTOLIC BLOOD PRESSURE: 78 MMHG | BODY MASS INDEX: 24.22 KG/M2 | SYSTOLIC BLOOD PRESSURE: 134 MMHG | WEIGHT: 173 LBS

## 2023-10-23 DIAGNOSIS — G47.09 OTHER INSOMNIA: ICD-10-CM

## 2023-10-23 DIAGNOSIS — R06.83 SNORING: Primary | ICD-10-CM

## 2023-10-23 PROCEDURE — 99214 OFFICE O/P EST MOD 30 MIN: CPT | Performed by: OTHER

## 2023-10-23 NOTE — TELEPHONE ENCOUNTER
Pt was seen to day 10/23/2023. Pt wants to see if they can try trazodone 50 mg instead of  sleep study for his headaches  see if they go away.

## 2023-10-23 NOTE — TELEPHONE ENCOUNTER
Pt wants to know if he can take trazodone for sleeping and not take quetiapine cause quetiapine gives him headaches.

## 2023-10-28 DIAGNOSIS — F51.04 PSYCHOPHYSIOLOGICAL INSOMNIA: ICD-10-CM

## 2023-10-28 DIAGNOSIS — F41.1 GAD (GENERALIZED ANXIETY DISORDER): ICD-10-CM

## 2023-10-28 RX ORDER — QUETIAPINE FUMARATE 25 MG/1
TABLET, FILM COATED ORAL
Qty: 135 TABLET | Refills: 0 | Status: SHIPPED | OUTPATIENT
Start: 2023-10-28

## 2023-10-28 NOTE — TELEPHONE ENCOUNTER
Last time medication was refilled 04/10/2023  Quantity and # of refills 135 w/ 1  Last OV 11/07/2022  Next OV No future appts. Due for Physical soon, please assist.     to coordinate appt with pt. Medication not on protocol.       Sent to Dr. Judy Meier for approval.

## 2023-10-30 ENCOUNTER — TELEPHONE (OUTPATIENT)
Dept: INTERNAL MEDICINE CLINIC | Facility: CLINIC | Age: 88
End: 2023-10-30

## 2023-11-03 ENCOUNTER — LAB ENCOUNTER (OUTPATIENT)
Dept: LAB | Age: 88
End: 2023-11-03
Attending: INTERNAL MEDICINE
Payer: MEDICARE

## 2023-11-03 ENCOUNTER — OFFICE VISIT (OUTPATIENT)
Dept: INTERNAL MEDICINE CLINIC | Facility: CLINIC | Age: 88
End: 2023-11-03
Payer: MEDICARE

## 2023-11-03 VITALS
BODY MASS INDEX: 23.94 KG/M2 | TEMPERATURE: 98 F | HEART RATE: 80 BPM | WEIGHT: 171 LBS | OXYGEN SATURATION: 98 % | DIASTOLIC BLOOD PRESSURE: 76 MMHG | SYSTOLIC BLOOD PRESSURE: 126 MMHG | RESPIRATION RATE: 16 BRPM | HEIGHT: 71 IN

## 2023-11-03 DIAGNOSIS — F13.20 SEDATIVE, HYPNOTIC OR ANXIOLYTIC DEPENDENCE, UNCOMPLICATED (HCC): ICD-10-CM

## 2023-11-03 DIAGNOSIS — F41.1 GAD (GENERALIZED ANXIETY DISORDER): ICD-10-CM

## 2023-11-03 DIAGNOSIS — K59.09 CHRONIC CONSTIPATION: ICD-10-CM

## 2023-11-03 DIAGNOSIS — G31.84 MILD COGNITIVE IMPAIRMENT: ICD-10-CM

## 2023-11-03 DIAGNOSIS — K21.9 GASTROESOPHAGEAL REFLUX DISEASE WITHOUT ESOPHAGITIS: ICD-10-CM

## 2023-11-03 DIAGNOSIS — E03.8 SUBCLINICAL HYPOTHYROIDISM: ICD-10-CM

## 2023-11-03 DIAGNOSIS — F41.9 ANXIETY: ICD-10-CM

## 2023-11-03 DIAGNOSIS — Z71.89 ENCOUNTER FOR CARDIAC RISK COUNSELING: ICD-10-CM

## 2023-11-03 DIAGNOSIS — Z00.00 ANNUAL PHYSICAL EXAM: Primary | ICD-10-CM

## 2023-11-03 DIAGNOSIS — Z00.00 ENCOUNTER FOR ANNUAL HEALTH EXAMINATION: ICD-10-CM

## 2023-11-03 DIAGNOSIS — N40.1 BPH WITH OBSTRUCTION/LOWER URINARY TRACT SYMPTOMS: ICD-10-CM

## 2023-11-03 DIAGNOSIS — R00.2 PALPITATIONS: ICD-10-CM

## 2023-11-03 DIAGNOSIS — Z79.899 ENCOUNTER FOR LONG-TERM (CURRENT) USE OF MEDICATIONS: ICD-10-CM

## 2023-11-03 DIAGNOSIS — N13.8 BPH WITH OBSTRUCTION/LOWER URINARY TRACT SYMPTOMS: ICD-10-CM

## 2023-11-03 DIAGNOSIS — K42.9 UMBILICAL HERNIA WITHOUT OBSTRUCTION AND WITHOUT GANGRENE: ICD-10-CM

## 2023-11-03 PROBLEM — R06.83 SNORING: Status: RESOLVED | Noted: 2023-02-14 | Resolved: 2023-11-03

## 2023-11-03 PROBLEM — G47.09 OTHER INSOMNIA: Status: RESOLVED | Noted: 2023-02-14 | Resolved: 2023-11-03

## 2023-11-03 LAB
ALBUMIN SERPL-MCNC: 3.4 G/DL (ref 3.4–5)
ALBUMIN/GLOB SERPL: 0.9 {RATIO} (ref 1–2)
ALP LIVER SERPL-CCNC: 75 U/L
ALT SERPL-CCNC: 27 U/L
ANION GAP SERPL CALC-SCNC: 5 MMOL/L (ref 0–18)
AST SERPL-CCNC: 21 U/L (ref 15–37)
BASOPHILS # BLD AUTO: 0.03 X10(3) UL (ref 0–0.2)
BASOPHILS NFR BLD AUTO: 0.5 %
BILIRUB SERPL-MCNC: 0.6 MG/DL (ref 0.1–2)
BUN BLD-MCNC: 22 MG/DL (ref 9–23)
CALCIUM BLD-MCNC: 8.7 MG/DL (ref 8.5–10.1)
CHLORIDE SERPL-SCNC: 102 MMOL/L (ref 98–112)
CHOLEST SERPL-MCNC: 153 MG/DL (ref ?–200)
CO2 SERPL-SCNC: 27 MMOL/L (ref 21–32)
CREAT BLD-MCNC: 1.13 MG/DL
EGFRCR SERPLBLD CKD-EPI 2021: 61 ML/MIN/1.73M2 (ref 60–?)
EOSINOPHIL # BLD AUTO: 0.37 X10(3) UL (ref 0–0.7)
EOSINOPHIL NFR BLD AUTO: 5.9 %
ERYTHROCYTE [DISTWIDTH] IN BLOOD BY AUTOMATED COUNT: 13.2 %
FASTING PATIENT LIPID ANSWER: NO
FASTING STATUS PATIENT QL REPORTED: NO
GLOBULIN PLAS-MCNC: 3.6 G/DL (ref 2.8–4.4)
GLUCOSE BLD-MCNC: 96 MG/DL (ref 70–99)
HCT VFR BLD AUTO: 43.9 %
HDLC SERPL-MCNC: 42 MG/DL (ref 40–59)
HGB BLD-MCNC: 15 G/DL
IMM GRANULOCYTES # BLD AUTO: 0.02 X10(3) UL (ref 0–1)
IMM GRANULOCYTES NFR BLD: 0.3 %
LDLC SERPL CALC-MCNC: 92 MG/DL (ref ?–100)
LYMPHOCYTES # BLD AUTO: 0.83 X10(3) UL (ref 1–4)
LYMPHOCYTES NFR BLD AUTO: 13.3 %
MCH RBC QN AUTO: 31 PG (ref 26–34)
MCHC RBC AUTO-ENTMCNC: 34.2 G/DL (ref 31–37)
MCV RBC AUTO: 90.7 FL
MONOCYTES # BLD AUTO: 0.76 X10(3) UL (ref 0.1–1)
MONOCYTES NFR BLD AUTO: 12.1 %
NEUTROPHILS # BLD AUTO: 4.25 X10 (3) UL (ref 1.5–7.7)
NEUTROPHILS # BLD AUTO: 4.25 X10(3) UL (ref 1.5–7.7)
NEUTROPHILS NFR BLD AUTO: 67.9 %
NONHDLC SERPL-MCNC: 111 MG/DL (ref ?–130)
OSMOLALITY SERPL CALC.SUM OF ELEC: 281 MOSM/KG (ref 275–295)
PLATELET # BLD AUTO: 228 10(3)UL (ref 150–450)
POTASSIUM SERPL-SCNC: 4.3 MMOL/L (ref 3.5–5.1)
PROT SERPL-MCNC: 7 G/DL (ref 6.4–8.2)
RBC # BLD AUTO: 4.84 X10(6)UL
SODIUM SERPL-SCNC: 134 MMOL/L (ref 136–145)
TRIGL SERPL-MCNC: 104 MG/DL (ref 30–149)
TSI SER-ACNC: 1.38 MIU/ML (ref 0.36–3.74)
VLDLC SERPL CALC-MCNC: 17 MG/DL (ref 0–30)
WBC # BLD AUTO: 6.3 X10(3) UL (ref 4–11)

## 2023-11-03 PROCEDURE — 36415 COLL VENOUS BLD VENIPUNCTURE: CPT

## 2023-11-03 PROCEDURE — 1125F AMNT PAIN NOTED PAIN PRSNT: CPT | Performed by: INTERNAL MEDICINE

## 2023-11-03 PROCEDURE — 84443 ASSAY THYROID STIM HORMONE: CPT

## 2023-11-03 PROCEDURE — G0439 PPPS, SUBSEQ VISIT: HCPCS | Performed by: INTERNAL MEDICINE

## 2023-11-03 PROCEDURE — 80053 COMPREHEN METABOLIC PANEL: CPT

## 2023-11-03 PROCEDURE — 80061 LIPID PANEL: CPT

## 2023-11-03 PROCEDURE — 85025 COMPLETE CBC W/AUTO DIFF WBC: CPT

## 2023-11-21 DIAGNOSIS — E03.9 ACQUIRED HYPOTHYROIDISM: ICD-10-CM

## 2023-11-21 RX ORDER — LIOTHYRONINE SODIUM 5 UG/1
7.5 TABLET ORAL 3 TIMES DAILY
Qty: 135 TABLET | Refills: 3 | Status: SHIPPED | OUTPATIENT
Start: 2023-11-21

## 2023-11-21 NOTE — TELEPHONE ENCOUNTER
Medication requested: Liothyronine 7.5mg    Is patient requesting 30 or 90 day supply:  90    Pharmacy name/location:  [address] [place] [address] Ave[address] Jaimee Steinberg [facility] [phone], [phone]     LOV:  11/03/2023    Is the patient due for appointment: no (if so, please schedule)    Additional notes:    Future Appointments   Date Time Provider Maude Walker   6/7/2024 11:00 AM Carmelita Schirmer, MD [facility] Nap 4

## 2024-01-15 DIAGNOSIS — E03.8 SUBCLINICAL HYPOTHYROIDISM: ICD-10-CM

## 2024-01-15 DIAGNOSIS — F41.1 GAD (GENERALIZED ANXIETY DISORDER): ICD-10-CM

## 2024-01-15 DIAGNOSIS — F51.04 PSYCHOPHYSIOLOGICAL INSOMNIA: ICD-10-CM

## 2024-01-15 RX ORDER — LEVOTHYROXINE SODIUM 0.05 MG/1
50 TABLET ORAL
Qty: 90 TABLET | Refills: 0 | Status: SHIPPED | OUTPATIENT
Start: 2024-01-15

## 2024-01-15 RX ORDER — QUETIAPINE FUMARATE 25 MG/1
TABLET, FILM COATED ORAL
Qty: 135 TABLET | Refills: 0 | Status: SHIPPED | OUTPATIENT
Start: 2024-01-15

## 2024-01-15 NOTE — TELEPHONE ENCOUNTER
QUEtiapine 25 MG Oral Tab   Last time medication was refilled 10/28/23  Quantity and # of refills 135 tab  Last OV 11/3/23  Next OV Due 11/24

## 2024-01-15 NOTE — TELEPHONE ENCOUNTER
Medication requested: levothyroxine 50 MCG Oral Tab     Is patient requesting 30 or 90 day supply:  90    Pharmacy name/location:  [facility] #[id number] [address] AT [facility] [address], [phone], [phone]     LOV:  11/03/2023-barron     Is the patient due for appointment: no  (if so, please schedule)    Additional notes:  no

## 2024-01-15 NOTE — TELEPHONE ENCOUNTER
Medication requested: QUEtiapine 25 MG Oral Tab   Is patient requesting 30 or 90 day supply:  90    Pharmacy name/location:  [facility] [id number] [address] [phone], [phone] [[id number]]     LOV:  11/03/2023-barron    Is the patient due for appointment: no (if so, please schedule)    Additional notes:  no

## 2024-01-15 NOTE — TELEPHONE ENCOUNTER
Spoke to patient regarding dosage. Patient is currently taking Levothyroxine 50 mcg daily. Rx passing protocol and sent to pharmacy. Patient made aware.

## 2024-04-04 DIAGNOSIS — F51.04 PSYCHOPHYSIOLOGICAL INSOMNIA: ICD-10-CM

## 2024-04-04 DIAGNOSIS — F41.1 GAD (GENERALIZED ANXIETY DISORDER): ICD-10-CM

## 2024-04-04 RX ORDER — QUETIAPINE FUMARATE 25 MG/1
TABLET, FILM COATED ORAL
Qty: 135 TABLET | Refills: 0 | Status: SHIPPED | OUTPATIENT
Start: 2024-04-04

## 2024-04-04 NOTE — TELEPHONE ENCOUNTER
Last time medication was refilled 01/15/2024  Last OV 11/03/2023  Next OV due/scheduled No Future Appointments      Medication not on protocol.

## 2024-04-05 ENCOUNTER — TELEPHONE (OUTPATIENT)
Dept: INTERNAL MEDICINE CLINIC | Facility: CLINIC | Age: 89
End: 2024-04-05

## 2024-04-05 DIAGNOSIS — E03.8 SUBCLINICAL HYPOTHYROIDISM: ICD-10-CM

## 2024-04-05 RX ORDER — LEVOTHYROXINE SODIUM 0.05 MG/1
50 TABLET ORAL
Qty: 90 TABLET | Refills: 2 | Status: SHIPPED | OUTPATIENT
Start: 2024-04-05

## 2024-04-05 RX ORDER — LEVOTHYROXINE SODIUM 0.05 MG/1
50 TABLET ORAL
Qty: 90 TABLET | Refills: 0 | Status: SHIPPED | OUTPATIENT
Start: 2024-04-05 | End: 2024-04-05

## 2024-04-05 NOTE — TELEPHONE ENCOUNTER
CHAN TORIBIOOB.    Pt inquiring if we sent 90 day supply for levothyroxine.  Pt informed yes we did.  AWV scheduled for 11/5.

## 2024-05-10 ENCOUNTER — TELEPHONE (OUTPATIENT)
Dept: SURGERY | Facility: CLINIC | Age: 89
End: 2024-05-10

## 2024-05-10 DIAGNOSIS — N40.1 BPH WITH OBSTRUCTION/LOWER URINARY TRACT SYMPTOMS: ICD-10-CM

## 2024-05-10 DIAGNOSIS — N13.8 BPH WITH OBSTRUCTION/LOWER URINARY TRACT SYMPTOMS: ICD-10-CM

## 2024-05-10 RX ORDER — TAMSULOSIN HYDROCHLORIDE 0.4 MG/1
0.4 CAPSULE ORAL
Qty: 90 CAPSULE | Refills: 0 | Status: SHIPPED | OUTPATIENT
Start: 2024-05-10

## 2024-05-10 NOTE — TELEPHONE ENCOUNTER
Pt's last OV was on 6/5/23.   Pt has follow up on 6/7/24.   Refill sent to requested pharmacy.

## 2024-05-10 NOTE — TELEPHONE ENCOUNTER
Per patient left a bottle of tamsulosin 0.4 mg in [place] and asking if he can get a new prescription sent to [facility] in [place]. Please advise

## 2024-06-07 ENCOUNTER — LAB ENCOUNTER (OUTPATIENT)
Dept: LAB | Age: 89
End: 2024-06-07
Attending: UROLOGY
Payer: MEDICARE

## 2024-06-07 ENCOUNTER — OFFICE VISIT (OUTPATIENT)
Dept: SURGERY | Facility: CLINIC | Age: 89
End: 2024-06-07

## 2024-06-07 DIAGNOSIS — R35.1 NOCTURIA: ICD-10-CM

## 2024-06-07 DIAGNOSIS — N40.1 BPH WITH OBSTRUCTION/LOWER URINARY TRACT SYMPTOMS: Primary | ICD-10-CM

## 2024-06-07 DIAGNOSIS — N13.8 BPH WITH OBSTRUCTION/LOWER URINARY TRACT SYMPTOMS: Primary | ICD-10-CM

## 2024-06-07 DIAGNOSIS — R97.20 ELEVATED PSA: ICD-10-CM

## 2024-06-07 DIAGNOSIS — N52.9 ERECTILE DYSFUNCTION, UNSPECIFIED ERECTILE DYSFUNCTION TYPE: ICD-10-CM

## 2024-06-07 LAB
APPEARANCE: CLEAR
BILIRUBIN: NEGATIVE
GLUCOSE (URINE DIPSTICK): NEGATIVE MG/DL
KETONES (URINE DIPSTICK): NEGATIVE MG/DL
LEUKOCYTES: NEGATIVE
MULTISTIX LOT#: NORMAL NUMERIC
NITRITE, URINE: NEGATIVE
OCCULT BLOOD: NEGATIVE
PH, URINE: 5.5 (ref 4.5–8)
PROTEIN (URINE DIPSTICK): NEGATIVE MG/DL
PSA SERPL-MCNC: 1.09 NG/ML (ref ?–4)
SPECIFIC GRAVITY: 1.01 (ref 1–1.03)
URINE-COLOR: YELLOW
UROBILINOGEN,SEMI-QN: 0.2 MG/DL (ref 0–1.9)

## 2024-06-07 PROCEDURE — 99214 OFFICE O/P EST MOD 30 MIN: CPT | Performed by: UROLOGY

## 2024-06-07 PROCEDURE — 84153 ASSAY OF PSA TOTAL: CPT

## 2024-06-07 PROCEDURE — 81003 URINALYSIS AUTO W/O SCOPE: CPT | Performed by: UROLOGY

## 2024-06-07 PROCEDURE — 36415 COLL VENOUS BLD VENIPUNCTURE: CPT

## 2024-06-07 RX ORDER — TAMSULOSIN HYDROCHLORIDE 0.4 MG/1
0.4 CAPSULE ORAL
Qty: 90 CAPSULE | Refills: 5 | Status: SHIPPED | OUTPATIENT
Start: 2024-06-07

## 2024-06-07 NOTE — PROGRESS NOTES
HPI:     Golden Hernandez is a 89 year old very healthy male with a PMH of hypothyroid, JAYLIN, GERD, LBP, hemorrhoids, recurrent UTI.     Following for:  1. BPH/LUTS with h/o retention  - flomax 5/11/21  2. OAB with nocturia  3. BWT noted on Ct  - cysto 6/2/21: mild BPH  4. ED  - 100 mg viagra prn ([facility])    PCP - Renetta  LOV 6/5/23    Presents for check-up. 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    Recommended he double or triple water intake, avoid fluids prior to bedtime, start flomax, continue to exercise, 100 mg viagra trial (coupon for [facility]). Get sleep study.    He feels well today. Urinary symptoms are good with flomax. He had negative sleep study. Sleeping better with seroquel.    Reported ~ 16 oz water per day and 20-30 oz coffee with medium yellow urine. Now 16 water, 20-30 coffee with medium urine. Again encouraged him to consider increasing water and cutting back on coffee to help with LUTS.    AUA SS is 2/35 with 1 n, f. Delighted with LUTS.  Incontinence: once in his life  Penoscrotal exam prior visit: right testis atrophy, no other abnormalities  TERRANCE prior visit: ~ 30-40 g prostate, no nodules or tenderness    UA was acidic, otherwise negative LOV and showed small blood in Mar 2019 but with < 2 RBCs per HPF. PVR was 2 mL LOV    50% potency without meds. Has not yet tried 100 mg viagra and probably won't.      CT A/P with IVC 4/5/21 for generalized abdominal pain: diffuse BWT possibly 2/2 underdistention or c/f cystitis and Dr Jackson started him on cipro x 7 days.  Cysto 6/2/21: mild BPH. No other abnormalities.    0.57 5/27/22. We discussed the risks and benefits to PSA screening and he would prefer to check but will remain conservative given age.    Will continue flomax, increase water intake in AM and avoid fluids in PM to help with nocturia. Observation for ED. F/u in 1 y.    Prior note:  Gross hematuria: none  Tobacco hx: 20 pack years, quit  50 y ago  Kidney stone hx: none  Fam h/o  malignancy: none    HISTORY:  Past Medical History:    Abdominal hernia    Actinic keratosis    Allergic rhinitis    Anxiety    Anxiety state    Back pain    Back problem    Belching    Colon polyps    Constipation    COVID-19    Cold like symptoms, hospitalization not required, no lingering symptoms    Depression    Diarrhea, unspecified    Esophageal reflux    Fainting    Fatigue    Frequent urination    Frequent UTI    Gastroenteritis    Hearing impairment    hearing aides    Hearing loss    Hemorrhoids    Hiatal hernia    Irregular bowel habits    JAYLIN (obstructive sleep apnea)    uses mouth guard    Reflux    Sleep apnea    Stool incontinence    Unspecified disorder of thyroid    Visual impairment    glasses    Wears glasses      Past Surgical History:   Procedure Laterality Date    Colonoscopy  2004    Colonoscopy      Diagnostic anoscopy  2004    Hernia surgery  1998    laparoscopic repair    Patient documented not to have experienced any of the following events  04/17/2014    Procedure: ESOPHAGOGASTRODUODENOSCOPY, POSSIBLE BIOPSY, POSSIBLE POLYPECTOMY 94511;  Surgeon: Jared Crabtree MD;  Location: [facility]    Patient withough preoperative order for iv antibiotic surgical site infection prophylaxis.  04/17/2014    Procedure: ESOPHAGOGASTRODUODENOSCOPY, POSSIBLE BIOPSY, POSSIBLE POLYPECTOMY 69202;  Surgeon: Jared Crabtree MD;  Location: [facility]    Sigmoidoscopy,diagnostic      Tonsillectomy  approximately 1938    Upper gi endo poss barrtts  04/2014    poss Shane's; esophagitis; HH; polyp    Upper gi endoscopy,biopsy  04/17/2014    Procedure: ESOPHAGOGASTRODUODENOSCOPY, POSSIBLE BIOPSY, POSSIBLE POLYPECTOMY 22325;  Surgeon: Jared Crabtree MD;  Location: [facility]      Family History   Problem Relation Age of Onset    Heart Disorder Father     Heart Disorder Mother     Diabetes Mother     Pulmonary Disease  Mother     Other (kidney disorder) Paternal Grandfather     Asthma Paternal Grandfather     Other (stomach cancer) Maternal Grandfather     Cancer Brother     Cancer Brother       Social History:   Social History     Socioeconomic History    Marital status:    Tobacco Use    Smoking status: Former     Current packs/day: 0.00     Average packs/day: 0.5 packs/day for 16.0 years (8.0 ttl pk-yrs)     Types: Cigarettes     Start date: 1947     Quit date: 1963     Years since quittin.1    Smokeless tobacco: Never   Vaping Use    Vaping status: Never Used   Substance and Sexual Activity    Alcohol use: Not Currently     Alcohol/week: 4.0 standard drinks of alcohol     Types: 4 Glasses of wine per week    Drug use: No   Other Topics Concern    Caffeine Concern No     Comment: decaf    Exercise Yes     Comment: treadmil and weights daily        Medications (Active prior to today's visit):  Current Outpatient Medications   Medication Sig Dispense Refill    tamsulosin 0.4 MG Oral Cap Take 1 capsule (0.4 mg total) by mouth After dinner. 90 capsule 5    Tazarotene (TAZORAC) 0.1 % External Cream Apply 1 Application topically nightly. 30 g 5    levothyroxine 50 MCG Oral Tab Take 1 tablet (50 mcg total) by mouth before breakfast. 90 tablet 2    QUEtiapine 25 MG Oral Tab TAKE ONE AND A HALF TABLETS BY MOUTH AT BEDTIME 135 tablet 0    liothyronine 5 MCG Oral Tab Take 1.5 tablets (7.5 mcg total) by mouth in the morning, at noon, and at bedtime. 135 tablet 3    mupirocin 2 % External Ointment Apply 1 Application. topically 2 (two) times daily. 22 g 2    desonide 0.05 % External Cream Apply 1 g topically 2 (two) times daily. Apply to groin bid 60 g 5    acetaminophen 500 MG Oral Tab Take 2 tablets (1,000 mg total) by mouth one time.      naproxen 220 MG Oral Tab Take by mouth daily.      Melatonin 5 MG Oral Tab Take by mouth nightly.      Multiple Vitamins-Minerals (MULTIVITAMIN OR) Take by mouth daily.        Misc  Natural Products (TURMERIC CURCUMIN) Oral Cap Take by mouth daily.      ALPHA-LIPOIC ACID OR Take by mouth daily.        PHOSPHATIDYL CHOLINE OR Take by mouth daily.      Nutritional Supplements (5-HTP TRYPTOPHAN OR) Take by mouth daily.         Allergies:  No Known Allergies      ROS:     A comprehensive 10 point review of systems was completed.  Pertinent positives and negatives noted in the the HPI.    PHYSICAL EXAM:     GENERAL APPEARANCE: well, developed, well nourished, in no acute distress  NEUROLOGIC: nonfocal, alert and oriented  HEAD: normocephalic, atraumatic  EYES: sclera non-icteric  EARS: hearing intact  ORAL CAVITY: mucosa moist  NECK/THYROID: no obvious goiter or masses  LUNGS: nonlabored breathing  ABDOMEN: soft, no obvious masses or tenderness  SKIN: no obvious rashes    : as noted above     ASSESSMENT/PLAN:   Diagnoses and all orders for this visit:    BPH with obstruction/lower urinary tract symptoms  -     URINALYSIS, AUTO, W/O SCOPE  -     tamsulosin 0.4 MG Oral Cap; Take 1 capsule (0.4 mg total) by mouth After dinner.    Erectile dysfunction, unspecified erectile dysfunction type    Nocturia    Elevated PSA  -     PSA Total, Diagnostic; Future      - as noted above.    Thanks again for this consult.    Timoteo Gatica MD, FACS  Urologist  [facility]  Office: [phone]

## 2024-06-26 DIAGNOSIS — F41.1 GAD (GENERALIZED ANXIETY DISORDER): ICD-10-CM

## 2024-06-26 DIAGNOSIS — F51.04 PSYCHOPHYSIOLOGICAL INSOMNIA: ICD-10-CM

## 2024-06-26 RX ORDER — QUETIAPINE FUMARATE 25 MG/1
TABLET, FILM COATED ORAL
Qty: 135 TABLET | Refills: 0 | Status: SHIPPED | OUTPATIENT
Start: 2024-06-26

## 2024-06-26 NOTE — TELEPHONE ENCOUNTER
Last time medication was refilled 4/4/24  Last office visit  11/3/23  Next office visit due/scheduled 11/5/24

## 2024-06-26 NOTE — TELEPHONE ENCOUNTER
Medication requested: QUEtiapine 25 MG Oral Tab     Is patient requesting 30 or 90 day supply:  90    Pharmacy name/location:  [facility] [id number] [address] [phone], [phone] [[id number]]     LOV:  11/03/2023    Is the patient due for appointment: NO    Additional notes:  NO

## 2024-09-11 DIAGNOSIS — F41.1 GAD (GENERALIZED ANXIETY DISORDER): ICD-10-CM

## 2024-09-11 DIAGNOSIS — F51.04 PSYCHOPHYSIOLOGICAL INSOMNIA: ICD-10-CM

## 2024-09-11 RX ORDER — QUETIAPINE FUMARATE 25 MG/1
TABLET, FILM COATED ORAL
Qty: 135 TABLET | Refills: 0 | Status: SHIPPED | OUTPATIENT
Start: 2024-09-11

## 2024-09-11 NOTE — TELEPHONE ENCOUNTER
Last time medication was refilled 06/26/2024  Last office visit  11/03/2023  Next office visit due/scheduled   Future Appointments   Date Time Provider Department Center   11/5/2024 11:00 AM Johann Jackson MD [facility] [address]   6/6/2025 10:45 AM Timoteo Gatica MD [id number] EC Nap 4     Medication not on protocol.

## 2024-10-10 DIAGNOSIS — E03.9 ACQUIRED HYPOTHYROIDISM: ICD-10-CM

## 2024-10-10 DIAGNOSIS — E03.8 SUBCLINICAL HYPOTHYROIDISM: ICD-10-CM

## 2024-10-10 RX ORDER — LEVOTHYROXINE SODIUM 50 UG/1
50 TABLET ORAL
Qty: 90 TABLET | Refills: 2 | Status: SHIPPED | OUTPATIENT
Start: 2024-10-10

## 2024-10-10 NOTE — TELEPHONE ENCOUNTER
Pt came in to check on refill for     Medication Quantity Refills Start End   levothyroxine 50 MCG Oral Tab 90 tablet 2 4/5/2024      [facility] [place] [address] [address] [facility] [address] [phone], [phone] [[id number]]   Medication requested: levothyroxine 50 MCG Oral Tab  Dose: 50 MCG Oral Tab    Is patient requesting 30 or 90 day supply:  90    [facility]    LOV:  11/3/23    Is the patient due for appointment:(if so, please schedule)    Additional notes:

## 2024-10-11 RX ORDER — LIOTHYRONINE SODIUM 5 UG/1
7.5 TABLET ORAL 3 TIMES DAILY
Qty: 135 TABLET | Refills: 3 | Status: SHIPPED | OUTPATIENT
Start: 2024-10-11

## 2024-10-11 NOTE — TELEPHONE ENCOUNTER
Spoke with Pharmacist at [facility] pharmacy  Requested refill on Liothyronin 7.5 mcg, pt refills only this Rx with them  Rx sent

## 2024-11-22 NOTE — TELEPHONE ENCOUNTER
Patient last seen 11/3/23:   Discussed with Dr. Estuardo Uriostegui to restart as needed Trazodone 50 mg. Have patient schedule appt as last appointment was 11/3/23.    Left detailed VM informing patient script will be sent to preferred pharmacy. Advised to schedule physical.

## 2024-12-05 DIAGNOSIS — F51.04 PSYCHOPHYSIOLOGICAL INSOMNIA: ICD-10-CM

## 2024-12-06 RX ORDER — TRAZODONE HYDROCHLORIDE 50 MG/1
50 TABLET, FILM COATED ORAL NIGHTLY PRN
Qty: 30 TABLET | Refills: 0 | OUTPATIENT
Start: 2024-12-06

## 2024-12-06 NOTE — TELEPHONE ENCOUNTER
[facility] #[id number] - [place], [place] - [address] AT [facility] [address], [phone], [phone]   [address] [facility] [address]   Phone: [phone] Fax: [phone]     ALSO SEND TO THIS PHARMACY TOO  -REQUESTED PER PATIENT

## 2024-12-06 NOTE — TELEPHONE ENCOUNTER
Patient currently at pharmacy now waiting on prescription at [facility].    Contacted [facility] pharmacy and spoke to pharmacist. Confirmed 30 day supply picked up at this pharmacy on 11/22. Dr. Jackson made aware.    Attempted to contact patient. Left VM informing that we can not send refill as he just picked up two weeks ago. Left detailed VM with [facility] pharmacy that refill will not be sent.

## 2024-12-08 DIAGNOSIS — F51.04 PSYCHOPHYSIOLOGICAL INSOMNIA: ICD-10-CM

## 2024-12-08 RX ORDER — TRAZODONE HYDROCHLORIDE 50 MG/1
50 TABLET, FILM COATED ORAL NIGHTLY PRN
Qty: 90 TABLET | Refills: 0 | Status: SHIPPED | OUTPATIENT
Start: 2024-12-08

## 2024-12-16 DIAGNOSIS — N13.8 BPH WITH OBSTRUCTION/LOWER URINARY TRACT SYMPTOMS: ICD-10-CM

## 2024-12-16 DIAGNOSIS — N40.1 BPH WITH OBSTRUCTION/LOWER URINARY TRACT SYMPTOMS: ICD-10-CM

## 2024-12-17 DIAGNOSIS — F51.04 PSYCHOPHYSIOLOGICAL INSOMNIA: ICD-10-CM

## 2024-12-18 RX ORDER — TRAZODONE HYDROCHLORIDE 50 MG/1
50 TABLET, FILM COATED ORAL NIGHTLY PRN
Qty: 30 TABLET | Refills: 0 | Status: SHIPPED | OUTPATIENT
Start: 2024-12-18

## 2024-12-18 NOTE — TELEPHONE ENCOUNTER
Last time medication was refilled 12/08/2024  Last office visit  11/03/2023  Next office visit due/scheduled   Future Appointments   Date Time Provider Department Center   6/6/2025 10:45 AM Timoteo Gatica MD [id number] EC Nap 4         Medication not on protocol.

## 2024-12-19 RX ORDER — TAMSULOSIN HYDROCHLORIDE 0.4 MG/1
0.4 CAPSULE ORAL
Qty: 90 CAPSULE | Refills: 5 | Status: SHIPPED | OUTPATIENT
Start: 2024-12-19

## 2025-01-10 DIAGNOSIS — F51.04 PSYCHOPHYSIOLOGICAL INSOMNIA: ICD-10-CM

## 2025-01-10 RX ORDER — TRAZODONE HYDROCHLORIDE 50 MG/1
50 TABLET, FILM COATED ORAL NIGHTLY PRN
Qty: 90 TABLET | Refills: 1 | Status: SHIPPED | OUTPATIENT
Start: 2025-01-10

## 2025-01-10 RX ORDER — TRAZODONE HYDROCHLORIDE 50 MG/1
50 TABLET, FILM COATED ORAL NIGHTLY PRN
Qty: 90 TABLET | Refills: 0 | Status: SHIPPED | OUTPATIENT
Start: 2025-01-10

## 2025-01-10 NOTE — TELEPHONE ENCOUNTER
Last time medication was refilled 12/18/2024  Last office visit  11/03/2023  Next office visit due/scheduled No Future Appointments        Medication not on protocol.

## 2025-03-12 ENCOUNTER — MOBILE ENCOUNTER (OUTPATIENT)
Dept: INTERNAL MEDICINE CLINIC | Facility: CLINIC | Age: OVER 89
End: 2025-03-12

## 2025-03-12 DIAGNOSIS — E03.8 SUBCLINICAL HYPOTHYROIDISM: ICD-10-CM

## 2025-03-12 DIAGNOSIS — E03.9 ACQUIRED HYPOTHYROIDISM: Primary | ICD-10-CM

## 2025-03-12 RX ORDER — LEVOTHYROXINE SODIUM 50 UG/1
50 TABLET ORAL
Qty: 90 TABLET | Refills: 2 | Status: SHIPPED | OUTPATIENT
Start: 2025-03-12 | End: 2025-03-12

## 2025-03-12 RX ORDER — LEVOTHYROXINE SODIUM 50 UG/1
50 TABLET ORAL
Qty: 90 TABLET | Refills: 2 | Status: SHIPPED | OUTPATIENT
Start: 2025-03-12

## 2025-04-15 ENCOUNTER — TELEPHONE (OUTPATIENT)
Dept: INTERNAL MEDICINE CLINIC | Facility: CLINIC | Age: OVER 89
End: 2025-04-15

## 2025-04-15 DIAGNOSIS — F51.04 PSYCHOPHYSIOLOGICAL INSOMNIA: ICD-10-CM

## 2025-04-15 RX ORDER — TRAZODONE HYDROCHLORIDE 50 MG/1
50 TABLET ORAL NIGHTLY PRN
Qty: 90 TABLET | Refills: 0 | Status: SHIPPED | OUTPATIENT
Start: 2025-04-15

## 2025-04-15 RX ORDER — TRAZODONE HYDROCHLORIDE 50 MG/1
50 TABLET ORAL NIGHTLY PRN
Qty: 90 TABLET | Refills: 0 | OUTPATIENT
Start: 2025-04-15

## 2025-04-15 NOTE — TELEPHONE ENCOUNTER
Front office advised to assist patient with scheduling appt as he has not been seen since Nov 2023. Per Dr. Renetta dooley to send one refill. Script sent to pharmacy.

## 2025-04-15 NOTE — TELEPHONE ENCOUNTER
Patient walked in requesting refill for     TRAZODONE 50 MG Oral Tab     [facility] #[id number] is preferred pharmacy      Okay to prescribe?

## 2025-05-28 ENCOUNTER — OFFICE VISIT (OUTPATIENT)
Dept: INTERNAL MEDICINE CLINIC | Facility: CLINIC | Age: OVER 89
End: 2025-05-28
Payer: MEDICARE

## 2025-05-28 VITALS
SYSTOLIC BLOOD PRESSURE: 134 MMHG | OXYGEN SATURATION: 97 % | DIASTOLIC BLOOD PRESSURE: 82 MMHG | HEIGHT: 71 IN | WEIGHT: 172 LBS | BODY MASS INDEX: 24.08 KG/M2 | HEART RATE: 69 BPM | TEMPERATURE: 98 F | RESPIRATION RATE: 18 BRPM

## 2025-05-28 DIAGNOSIS — R59.1 LYMPHADENOPATHY OF HEAD AND NECK: Primary | ICD-10-CM

## 2025-05-28 PROCEDURE — 99213 OFFICE O/P EST LOW 20 MIN: CPT | Performed by: NURSE PRACTITIONER

## 2025-05-28 PROCEDURE — G2211 COMPLEX E/M VISIT ADD ON: HCPCS | Performed by: NURSE PRACTITIONER

## 2025-05-28 RX ORDER — METHYLPREDNISOLONE 4 MG/1
TABLET ORAL
Qty: 1 EACH | Refills: 0 | Status: SHIPPED | OUTPATIENT
Start: 2025-05-28

## 2025-05-28 NOTE — PROGRESS NOTES
The following individual(s) verbally consented to be recorded using ambient AI listening technology and understand that they can each withdraw their consent to this listening technology at any point by asking the clinician to turn off or pause the recording:    Patient name: Golden Hernandez  Additional names:  None

## 2025-05-28 NOTE — PROGRESS NOTES
HPI:      Patient ID: Golden Hernandez is a 89 year old male.    Chief Complaint   Patient presents with    Throat Problem       Feels something is stuck in the right side of his throat since earlier today. No pain or choking with swallowing. No swollen tongue, lips.         Review of Systems   Constitutional:  Negative for fatigue, fever and unexpected weight change.   Respiratory: Negative.  Negative for cough, choking, chest tightness and shortness of breath.    Cardiovascular: Negative.    Gastrointestinal: Negative.    All other systems reviewed and are negative.        Past Medical History[1]  Past Surgical History[2]  Family History[3]  Social Hx on file[4]     Current Medications[5]  Allergies:Allergies[6]    Lab Results   Component Value Date    GLU 96 11/03/2023    BUN 22 11/03/2023    BUNCREA 20.2 (H) 03/25/2021    CREATSERUM 1.13 11/03/2023    ANIONGAP 5 11/03/2023    GFRNAA 68 02/08/2022    GFRAA 79 02/08/2022    CA 8.7 11/03/2023    OSMOCALC 281 11/03/2023    ALKPHO 75 11/03/2023    AST 21 11/03/2023    ALT 27 11/03/2023    BILT 0.6 11/03/2023    TP 7.0 11/03/2023    ALB 3.4 11/03/2023    GLOBULIN 3.6 11/03/2023     (L) 11/03/2023    K 4.3 11/03/2023     11/03/2023    CO2 27.0 11/03/2023     Lab Results   Component Value Date    WBC 6.3 11/03/2023    RBC 4.84 11/03/2023    HGB 15.0 11/03/2023    HCT 43.9 11/03/2023    MCV 90.7 11/03/2023    MCH 31.0 11/03/2023    MCHC 34.2 11/03/2023    RDW 13.2 11/03/2023    .0 11/03/2023     Lab Results   Component Value Date    CHOLEST 153 11/03/2023    TRIG 104 11/03/2023    HDL 42 11/03/2023    LDL 92 11/03/2023    VLDL 17 11/03/2023    TCHDLRATIO 4.40 01/02/2019    NONHDLC 111 11/03/2023     Lab Results   Component Value Date    A1C 5.4 12/27/2021     Lab Results   Component Value Date    T4F 0.8 03/25/2021    TSH 1.380 11/03/2023     No results found for: \"VITD\", \"QVITD\", \"[id number]\"  No results found for: \"JOSE\"  Lab Results   Component Value  Date    FOLIC 24.7 02/08/2022     No results found for: \"IRON\", \"IRONTOT\"  Lab Results   Component Value Date    B12 1,087 (H) 02/08/2022     No results found for: \"PHOS\", \"PHOSPHORUS\"  No results found for: \"MG\"     PHYSICAL EXAM:   /82   Pulse 69   Temp 98 °F (36.7 °C) (Temporal)   Resp 18   Ht 5' 11\" (1.803 m)   Wt 172 lb (78 kg)   SpO2 97%   BMI 23.99 kg/m²   Physical Exam  Vitals and nursing note reviewed.   Constitutional:       Appearance: Normal appearance.   Cardiovascular:      Rate and Rhythm: Normal rate.   Pulmonary:      Effort: Pulmonary effort is normal.   Musculoskeletal:      Cervical back: Normal range of motion. Tenderness (right side) present.   Lymphadenopathy:      Head:      Right side of head: Tonsillar adenopathy present.   Neurological:      Mental Status: He is alert.              ASSESSMENT/PLAN:   Diagnoses and all orders for this visit:    Lymphadenopathy of head and neck  -     methylPREDNISolone (MEDROL) 4 MG Oral Tablet Therapy Pack; As directed.      Follow up in 1 month for physical     JERRY Nunez            [1]   Past Medical History:   Abdominal hernia    Actinic keratosis    Allergic rhinitis    Anxiety    Anxiety state    Back pain    Back problem    Belching    Colon polyps    Constipation    COVID-19    Cold like symptoms, hospitalization not required, no lingering symptoms    Depression    Diarrhea, unspecified    Esophageal reflux    Fainting    Fatigue    Frequent urination    Frequent UTI    Gastroenteritis    Hearing impairment    hearing aides    Hearing loss    Hemorrhoids    Hiatal hernia    Irregular bowel habits    JAYLIN (obstructive sleep apnea)    uses mouth guard    Reflux    Sleep apnea    Stool incontinence    Unspecified disorder of thyroid    Visual impairment    glasses    Wears glasses   [2]   Past Surgical History:  Procedure Laterality Date    Colonoscopy  2004    Colonoscopy      Diagnostic anoscopy  2004    Hernia surgery  1998     laparoscopic repair    Patient documented not to have experienced any of the following events  2014    Procedure: ESOPHAGOGASTRODUODENOSCOPY, POSSIBLE BIOPSY, POSSIBLE POLYPECTOMY 63325;  Surgeon: Jared Crabtree MD;  Location: [facility]    Patient withough preoperative order for iv antibiotic surgical site infection prophylaxis.  2014    Procedure: ESOPHAGOGASTRODUODENOSCOPY, POSSIBLE BIOPSY, POSSIBLE POLYPECTOMY 44311;  Surgeon: Jared Crabtree MD;  Location: [facility]    Sigmoidoscopy,diagnostic      Tonsillectomy  approximately 1938    Upper gi endo poss barrtts  2014    poss Shane's; esophagitis; HH; polyp    Upper gi endoscopy,biopsy  2014    Procedure: ESOPHAGOGASTRODUODENOSCOPY, POSSIBLE BIOPSY, POSSIBLE POLYPECTOMY 72159;  Surgeon: Jared Crabtree MD;  Location: [facility]   [3]   Family History  Problem Relation Age of Onset    Heart Disorder Father     Heart Disorder Mother     Diabetes Mother     Pulmonary Disease Mother     Other (kidney disorder) Paternal Grandfather     Asthma Paternal Grandfather     Other (stomach cancer) Maternal Grandfather     Cancer Brother     Cancer Brother    [4]   Social History  Socioeconomic History    Marital status:    Tobacco Use    Smoking status: Former     Current packs/day: 0.00     Average packs/day: 0.5 packs/day for 16.0 years (8.0 ttl pk-yrs)     Types: Cigarettes     Start date: 1947     Quit date: 1963     Years since quittin.1    Smokeless tobacco: Never   Vaping Use    Vaping status: Never Used   Substance and Sexual Activity    Alcohol use: Not Currently     Alcohol/week: 4.0 standard drinks of alcohol     Types: 4 Glasses of wine per week    Drug use: No   Other Topics Concern    Caffeine Concern No     Comment: decaf    Exercise Yes     Comment: treadmil and weights daily   [5]   Current Outpatient Medications   Medication Sig Dispense Refill     methylPREDNISolone (MEDROL) 4 MG Oral Tablet Therapy Pack As directed. 1 each 0    traZODone 50 MG Oral Tab Take 1 tablet (50 mg total) by mouth nightly as needed for Sleep. AT BEDTIME 90 tablet 0    levothyroxine 50 MCG Oral Tab Take 1 tablet (50 mcg total) by mouth before breakfast. 90 tablet 2    TRAZODONE 50 MG Oral Tab TAKE 1 TABLET(50 MG) BY MOUTH EVERY NIGHT AS NEEDED FOR SLEEP 90 tablet 0    TAMSULOSIN 0.4 MG Oral Cap TAKE 1 CAPSULE(0.4 MG) BY MOUTH EVERY EVENING 30 MINUTES AFTER THE SAME MEAL 90 capsule 5    liothyronine 5 MCG Oral Tab Take 1.5 tablets (7.5 mcg total) by mouth in the morning, at noon, and at bedtime. 135 tablet 3    QUEtiapine 25 MG Oral Tab TAKE 1 AND 1/2 TABLET BY MOUTH EVERY NIGHT AT BEDTIME 135 tablet 0    mupirocin 2 % External Ointment Apply 1 Application. topically 2 (two) times daily. 22 g 2    desonide 0.05 % External Cream Apply 1 g topically 2 (two) times daily. Apply to groin bid 60 g 5    acetaminophen 500 MG Oral Tab Take 2 tablets (1,000 mg total) by mouth one time.      Melatonin 5 MG Oral Tab Take by mouth nightly.      Multiple Vitamins-Minerals (MULTIVITAMIN OR) Take by mouth daily.        Misc Natural Products (TURMERIC CURCUMIN) Oral Cap Take by mouth daily.      ALPHA-LIPOIC ACID OR Take by mouth daily.        PHOSPHATIDYL CHOLINE OR Take by mouth daily.      Nutritional Supplements (5-HTP TRYPTOPHAN OR) Take by mouth daily.      naproxen 220 MG Oral Tab Take by mouth daily. (Patient not taking: Reported on 5/28/2025)     [6] No Known Allergies

## 2025-06-03 ENCOUNTER — OFFICE VISIT (OUTPATIENT)
Dept: INTERNAL MEDICINE CLINIC | Facility: CLINIC | Age: OVER 89
End: 2025-06-03
Payer: MEDICARE

## 2025-06-03 VITALS
BODY MASS INDEX: 23.52 KG/M2 | RESPIRATION RATE: 16 BRPM | SYSTOLIC BLOOD PRESSURE: 126 MMHG | WEIGHT: 168 LBS | TEMPERATURE: 98 F | OXYGEN SATURATION: 98 % | HEART RATE: 77 BPM | DIASTOLIC BLOOD PRESSURE: 80 MMHG | HEIGHT: 71 IN

## 2025-06-03 DIAGNOSIS — K59.09 CHRONIC CONSTIPATION: ICD-10-CM

## 2025-06-03 DIAGNOSIS — E03.8 SUBCLINICAL HYPOTHYROIDISM: ICD-10-CM

## 2025-06-03 DIAGNOSIS — G31.84 MILD COGNITIVE IMPAIRMENT: ICD-10-CM

## 2025-06-03 DIAGNOSIS — F51.04 PSYCHOPHYSIOLOGICAL INSOMNIA: ICD-10-CM

## 2025-06-03 DIAGNOSIS — Z00.00 ENCOUNTER FOR ANNUAL HEALTH EXAMINATION: ICD-10-CM

## 2025-06-03 DIAGNOSIS — Z00.00 ANNUAL PHYSICAL EXAM: Primary | ICD-10-CM

## 2025-06-03 DIAGNOSIS — N40.1 BPH WITH OBSTRUCTION/LOWER URINARY TRACT SYMPTOMS: ICD-10-CM

## 2025-06-03 DIAGNOSIS — N13.8 BPH WITH OBSTRUCTION/LOWER URINARY TRACT SYMPTOMS: ICD-10-CM

## 2025-06-03 PROBLEM — F41.1 GAD (GENERALIZED ANXIETY DISORDER): Status: RESOLVED | Noted: 2022-03-17 | Resolved: 2025-06-03

## 2025-06-03 PROBLEM — F13.20 SEDATIVE, HYPNOTIC OR ANXIOLYTIC DEPENDENCE, UNCOMPLICATED (HCC): Status: RESOLVED | Noted: 2022-02-10 | Resolved: 2025-06-03

## 2025-06-03 PROBLEM — F41.9 ANXIETY: Status: RESOLVED | Noted: 2022-02-28 | Resolved: 2025-06-03

## 2025-06-03 PROBLEM — K42.9 UMBILICAL HERNIA WITHOUT OBSTRUCTION AND WITHOUT GANGRENE: Status: RESOLVED | Noted: 2022-03-17 | Resolved: 2025-06-03

## 2025-06-03 PROCEDURE — G0439 PPPS, SUBSEQ VISIT: HCPCS | Performed by: INTERNAL MEDICINE

## 2025-06-03 RX ORDER — TRAZODONE HYDROCHLORIDE 50 MG/1
50 TABLET ORAL NIGHTLY PRN
Qty: 90 TABLET | Refills: 0 | Status: SHIPPED | OUTPATIENT
Start: 2025-06-03

## 2025-06-03 NOTE — TELEPHONE ENCOUNTER
Medication requested: traZODone 50 MG Oral Tab    Is patient requesting 30 or 90 day supply:  90    Pharmacy name/location:  [id number] [place] [address] AT [facility] [address], [phone], [phone]     LOV:  06/03/2025    Is the patient due for appointment: no (if so, please schedule)    Additional notes:  Patient lost his bag with meds inside - he would like a new refill

## 2025-06-03 NOTE — TELEPHONE ENCOUNTER
Rx pended for approval with note to to pharmacy to fill early due to Patient lost medication bottle

## 2025-06-03 NOTE — PROGRESS NOTES
Subjective:   Golden Hernandez is a 89 year old male who presents for a Medicare Wellness Visit charge within the last 11 months and Patient may not meet criteria for AWV: Please evaluate for correct coding and scheduled follow up of multiple significant but stable problems.             HPI  Feels fine.   Trazodone controlling insomnia  Not taking seroquel to help sleep    Pt has had labs done with longevity physician in [place], [place]. He will forward those labs for my review    PAST MEDICAL, SOCIAL, FAMILY HISTORIES REVIEWED WITH PT      History/Other:   Fall Risk Assessment:   He has been screened for Falls and is low risk.      Cognitive Assessment:   He had a completely normal cognitive assessment - see flowsheet entries       Functional Ability/Status:   Golden Hernandez has a completely normal functional assessment. See flowsheet for details.      Depression Screening (PHQ):  PHQ-2 SCORE: 0  , done 6/3/2025            Advanced Directives:   He does have a Living Will but we do NOT have it on file in Epic.    He does have a POA but we do NOT have it on file in Epic.    Discussed Advance Care Planning with patient (and family/surrogate if present). Standard forms made available to patient in After Visit Summary.      Problem List[1]  Allergies:  He has no known allergies.    Current Medications:  Active Meds, Sig Only[2]    Medical History:  He  has a past medical history of Abdominal hernia, Actinic keratosis, Allergic rhinitis, Anxiety (comes and goes dependent upon conditions at home), Anxiety state, Back pain, Back problem, Belching, Colon polyps (1975), Constipation, COVID-19 (01/20/2022), Depression, Diarrhea, unspecified, Esophageal reflux, Fainting, Fatigue, Frequent urination, Frequent UTI, Gastroenteritis, Hearing impairment, Hearing loss, Hemorrhoids, Hiatal hernia (1996), Irregular bowel habits, JAYLIN (obstructive sleep apnea), Reflux, Sleep apnea, Stool incontinence, Unspecified disorder of  thyroid, Visual impairment, and Wears glasses.  Surgical History:  He  has a past surgical history that includes colonoscopy (2004); diagnostic anoscopy (2004); hernia surgery (1998); upper gi endo poss barrtts (04/2014); upper gi endoscopy,biopsy (04/17/2014); patient withough preoperative order for iv antibiotic surgical site infection prophylaxis. (04/17/2014); patient documented not to have experienced any of the following events (04/17/2014); sigmoidoscopy,diagnostic; colonoscopy; and tonsillectomy (approximately 1938).   Family History:  His family history includes Asthma in his paternal grandfather; Cancer in his brother and brother; Diabetes in his mother; Heart Disorder in his father and mother; Pulmonary Disease in his mother; kidney disorder in his paternal grandfather; stomach cancer in his maternal grandfather.  Social History:  He  reports that he quit smoking about 62 years ago. His smoking use included cigarettes. He started smoking about 78 years ago. He has a 8 pack-year smoking history. He has never used smokeless tobacco. He reports that he does not currently use alcohol after a past usage of about 4.0 standard drinks of alcohol per week. He reports that he does not use drugs.    Tobacco:  He smoked tobacco in the past but quit greater than 12 months ago.  Tobacco Use[3]     CAGE Alcohol Screen:   CAGE screening score of 0 on 6/3/2025, showing low risk of alcohol abuse.      Patient Care Team:  Johann Jackson MD as PCP - General (Internal Medicine)    Review of Systems  A comprehensive 10 point review of systems was completed.     Pertinent positives and negatives noted in the HPI.      Objective:   Physical Exam  General: No acute distress. Alert and oriented x 3.  HEENT: Normocephalic atraumatic.   Neck: No lymphadenopathy.   Respiratory: Clear to auscultation bilaterally. No wheezes. No rhonchi.  Cardiovascular: S1, S2. Regular rate and rhythm.   Abdomen: Soft,  no pain.  nontender,  nondistended.  Positive bowel sounds. .  Extremities: No edema or cyanosis.  Integument: No rashes or lesions.    Psychiatric: Appropriate mood and affect.    /80   Pulse 77   Temp 98 °F (36.7 °C)   Resp 16   Ht 5' 11\" (1.803 m)   Wt 168 lb (76.2 kg)   SpO2 98%   BMI 23.43 kg/m²  Estimated body mass index is 23.43 kg/m² as calculated from the following:    Height as of this encounter: 5' 11\" (1.803 m).    Weight as of this encounter: 168 lb (76.2 kg).    Medicare Hearing Assessment:   Hearing Screening    Screening Method: Whisper Test  Whisper Test Result: Pass               Assessment & Plan:   Golden Hernandez is a 89 year old male who presents for a Medicare Assessment.     1. Annual physical exam (Primary)  2. BPH with obstruction/lower urinary tract symptoms  3. Chronic constipation  4. Mild cognitive impairment  5. Subclinical hypothyroidism- symptomatic . fatigue  6. Encounter for annual health examination  7. Psychophysiological insomnia              Subclinical hypothyroidism- symptomatic . fatigue  /Hypothyroidism-euthyroid. Cont replacement therapy per wellness physician he sees     GERD (gastroesophageal reflux disease)- stable. Cont PPI     BPH with obstruction/lower urinary tract symptoms- stable. Cont flomax      Mild cognitive impairment- stable sxs . Monitor     insomnia- cont trazodone 50 mg at bedtime   .  The patient indicates understanding of these issues and agrees to the plan.  Continue with current treatment plan.  Lab work ordered.  Reinforced healthy diet, lifestyle, and exercise.      Return in about 1 year (around 6/3/2026) for physical.     Johann Jackson MD, 6/3/2025     Supplementary Documentation:   General Health:  In the past six months, have you lost more than 10 pounds without trying?: 2 - No  Has your appetite been poor?: No  Type of Diet: Balanced  How does the patient maintain a good energy level?: Daily Walks  How would you describe your daily physical  activity?: Light  How would you describe your current health state?: Good  How do you maintain positive mental well-being?: Visiting Friends, Visiting Family  On a scale of 0 to 10, with 0 being no pain and 10 being severe pain, what is your pain level?: 0 - (None)  In the past six months, have you experienced urine leakage?: 0-No  At any time do you feel concerned for the safety/well-being of yourself and/or your children, in your home or elsewhere?: No  Have you had any immunizations at another office such as Influenza, Hepatitis B, Tetanus, or Pneumococcal?: Yes    Health Maintenance   Topic Date Due    Zoster Vaccines (1 of 2) Never done    COVID-19 Vaccine (7 - 2024-25 season) 09/01/2024    Annual Physical  11/03/2024    Annual Depression Screening  01/01/2025    Fall Risk Screening (Annual)  01/01/2025    Influenza Vaccine (Season Ended) 10/01/2025    Pneumococcal Vaccine: 50+ Years  Completed    Meningococcal B Vaccine  Aged Out            [1]   Patient Active Problem List  Diagnosis    BPH with obstruction/lower urinary tract symptoms    Personal history of COVID-19    Chronic constipation    Subclinical hypothyroidism- symptomatic . fatigue    Mild cognitive impairment   [2]   Outpatient Medications Marked as Taking for the 6/3/25 encounter (Office Visit) with Johann Jackson MD   Medication Sig    traZODone 50 MG Oral Tab Take 1 tablet (50 mg total) by mouth nightly as needed for Sleep. AT BEDTIME    levothyroxine 50 MCG Oral Tab Take 1 tablet (50 mcg total) by mouth before breakfast.    TAMSULOSIN 0.4 MG Oral Cap TAKE 1 CAPSULE(0.4 MG) BY MOUTH EVERY EVENING 30 MINUTES AFTER THE SAME MEAL    liothyronine 5 MCG Oral Tab Take 1.5 tablets (7.5 mcg total) by mouth in the morning, at noon, and at bedtime.    QUEtiapine 25 MG Oral Tab TAKE 1 AND 1/2 TABLET BY MOUTH EVERY NIGHT AT BEDTIME    mupirocin 2 % External Ointment Apply 1 Application. topically 2 (two) times daily.    desonide 0.05 % External Cream Apply  1 g topically 2 (two) times daily. Apply to groin bid    acetaminophen 500 MG Oral Tab Take 2 tablets (1,000 mg total) by mouth one time.    naproxen 220 MG Oral Tab Take by mouth daily.    Melatonin 5 MG Oral Tab Take by mouth nightly.    Multiple Vitamins-Minerals (MULTIVITAMIN OR) Take by mouth daily.      Misc Natural Products (TURMERIC CURCUMIN) Oral Cap Take by mouth daily.    ALPHA-LIPOIC ACID OR Take by mouth daily.      PHOSPHATIDYL CHOLINE OR Take by mouth daily.    Nutritional Supplements (5-HTP TRYPTOPHAN OR) Take by mouth daily.   [3]   Social History  Tobacco Use   Smoking Status Former    Current packs/day: 0.00    Average packs/day: 0.5 packs/day for 16.0 years (8.0 ttl pk-yrs)    Types: Cigarettes    Start date: 1947    Quit date: 1963    Years since quittin.1   Smokeless Tobacco Never

## 2025-06-19 NOTE — PROGRESS NOTES
HPI:     Golden Hernandez is a 89 year old very healthy male with a PMH of hypothyroid, JAYLIN, GERD, LBP, hemorrhoids, recurrent UTI.     Following for:  1. BPH/LUTS with h/o retention  - flomax 5/11/21  2. OAB with nocturia  3. BWT noted on Ct  - cysto 6/2/21: mild BPH  4. ED  - 100 mg viagra prn ([facility])    PCP - Renetta  LOV 6/6/24    Presents for check-up. 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    Recommended he double or triple water intake, avoid fluids prior to bedtime, start flomax, continue to exercise, 100 mg viagra trial (coupon for [facility]). Get sleep study.    He feels well today. Urinary symptoms are good with flomax. He had negative sleep study. Sleeping better with seroquel.    Reported ~ 16 oz water per day and 20-30 oz coffee with medium yellow urine. Now 16 water, 20-30 coffee with medium urine. Again encouraged him to consider increasing water and cutting back on coffee to help with LUTS.    AUA SS is 2/35 with 1 n, f. Delighted with LUTS.  Incontinence: once in his life  Penoscrotal exam prior visit: right testis atrophy, no other abnormalities  TERRANCE prior visit: ~ 30-40 g prostate, no nodules or tenderness    UA was acidic, otherwise negative LOV and showed small blood in Mar 2019 but with < 2 RBCs per HPF. PVR was 2 mL LOV    50% potency without meds. Has not yet tried 100 mg viagra and probably won't.      CT A/P with IVC 4/5/21 for generalized abdominal pain: diffuse BWT possibly 2/2 underdistention or c/f cystitis and Dr Jackson started him on cipro x 7 days.  Cysto 6/2/21: mild BPH. No other abnormalities.    PSA 1.09 6/7/24    Will continue flomax, increase water intake in AM and avoid fluids in PM to help with nocturia. Observation for ED. F/u in 1 y - needs appt written down for wife to put on calendar.    Prior note:  Gross hematuria: none  Tobacco hx: 20 pack years, quit 50 y ago  Kidney stone hx: none  Fam h/o  malignancy:  none    HISTORY:  Past Medical History:    Abdominal hernia    Actinic keratosis    Allergic rhinitis    Anxiety    Anxiety state    Back pain    Back problem    Belching    Colon polyps    Constipation    COVID-19    Cold like symptoms, hospitalization not required, no lingering symptoms    Depression    Diarrhea, unspecified    Esophageal reflux    Fainting    Fatigue    Frequent urination    Frequent UTI    Gastroenteritis    Hearing impairment    hearing aides    Hearing loss    Hemorrhoids    Hiatal hernia    Irregular bowel habits    JAYLIN (obstructive sleep apnea)    uses mouth guard    Reflux    Sleep apnea    Stool incontinence    Unspecified disorder of thyroid    Visual impairment    glasses    Wears glasses      Past Surgical History:   Procedure Laterality Date    Colonoscopy  2004    Colonoscopy      Diagnostic anoscopy  2004    Hernia surgery  1998    laparoscopic repair    Patient documented not to have experienced any of the following events  04/17/2014    Procedure: ESOPHAGOGASTRODUODENOSCOPY, POSSIBLE BIOPSY, POSSIBLE POLYPECTOMY 29617;  Surgeon: Jared Crabtree MD;  Location: [facility]    Patient withough preoperative order for iv antibiotic surgical site infection prophylaxis.  04/17/2014    Procedure: ESOPHAGOGASTRODUODENOSCOPY, POSSIBLE BIOPSY, POSSIBLE POLYPECTOMY 57970;  Surgeon: Jared Crabtree MD;  Location: [facility]    Sigmoidoscopy,diagnostic      Tonsillectomy  approximately 1938    Upper gi endo poss barrtts  04/2014    poss Shane's; esophagitis; HH; polyp    Upper gi endoscopy,biopsy  04/17/2014    Procedure: ESOPHAGOGASTRODUODENOSCOPY, POSSIBLE BIOPSY, POSSIBLE POLYPECTOMY 38449;  Surgeon: Jared Crabtree MD;  Location: [facility]      Family History   Problem Relation Age of Onset    Heart Disorder Father     Heart Disorder Mother     Diabetes Mother     Pulmonary Disease Mother     Other (kidney disorder) Paternal Grandfather      Asthma Paternal Grandfather     Other (stomach cancer) Maternal Grandfather     Cancer Brother     Cancer Brother       Social History:   Social History     Socioeconomic History    Marital status:    Tobacco Use    Smoking status: Former     Current packs/day: 0.00     Average packs/day: 0.5 packs/day for 16.0 years (8.0 ttl pk-yrs)     Types: Cigarettes     Start date: 1947     Quit date: 1963     Years since quittin.2    Smokeless tobacco: Never   Vaping Use    Vaping status: Never Used   Substance and Sexual Activity    Alcohol use: Not Currently     Alcohol/week: 4.0 standard drinks of alcohol     Types: 4 Glasses of wine per week    Drug use: No   Other Topics Concern    Caffeine Concern No     Comment: decaf    Exercise Yes     Comment: treadmil and weights daily        Medications (Active prior to today's visit):  Current Outpatient Medications   Medication Sig Dispense Refill    traZODone 50 MG Oral Tab Take 1 tablet (50 mg total) by mouth nightly as needed for Sleep. 90 tablet 0    levothyroxine 50 MCG Oral Tab Take 1 tablet (50 mcg total) by mouth before breakfast. 90 tablet 2    TAMSULOSIN 0.4 MG Oral Cap TAKE 1 CAPSULE(0.4 MG) BY MOUTH EVERY EVENING 30 MINUTES AFTER THE SAME MEAL 90 capsule 5    liothyronine 5 MCG Oral Tab Take 1.5 tablets (7.5 mcg total) by mouth in the morning, at noon, and at bedtime. 135 tablet 3    QUEtiapine 25 MG Oral Tab TAKE 1 AND 1/2 TABLET BY MOUTH EVERY NIGHT AT BEDTIME 135 tablet 0    mupirocin 2 % External Ointment Apply 1 Application. topically 2 (two) times daily. 22 g 2    desonide 0.05 % External Cream Apply 1 g topically 2 (two) times daily. Apply to groin bid 60 g 5    acetaminophen 500 MG Oral Tab Take 2 tablets (1,000 mg total) by mouth one time.      naproxen 220 MG Oral Tab Take by mouth daily.      Melatonin 5 MG Oral Tab Take by mouth nightly.      Multiple Vitamins-Minerals (MULTIVITAMIN OR) Take by mouth daily.        Misc Natural  Products (TURMERIC CURCUMIN) Oral Cap Take by mouth daily.      ALPHA-LIPOIC ACID OR Take by mouth daily.        PHOSPHATIDYL CHOLINE OR Take by mouth daily.      Nutritional Supplements (5-HTP TRYPTOPHAN OR) Take by mouth daily.         Allergies:  No Known Allergies      ROS:     A comprehensive 10 point review of systems was completed.  Pertinent positives and negatives noted in the the HPI.    PHYSICAL EXAM:     GENERAL APPEARANCE: well, developed, well nourished, in no acute distress  NEUROLOGIC: nonfocal, alert and oriented  HEAD: normocephalic, atraumatic  EYES: sclera non-icteric  EARS: hearing intact  ORAL CAVITY: mucosa moist  NECK/THYROID: no obvious goiter or masses  LUNGS: nonlabored breathing  ABDOMEN: soft, no obvious masses or tenderness  SKIN: no obvious rashes    : as noted above     ASSESSMENT/PLAN:   Diagnoses and all orders for this visit:    BPH with obstruction/lower urinary tract symptoms    Erectile dysfunction, unspecified erectile dysfunction type    Nocturia    Bladder wall thickening      - as noted above.    Thanks again for this consult.    Timoteo Gatica MD, FACS  Urologist  [facility]  Office: [phone]

## 2025-06-27 ENCOUNTER — OFFICE VISIT (OUTPATIENT)
Dept: SURGERY | Facility: CLINIC | Age: OVER 89
End: 2025-06-27

## 2025-06-27 DIAGNOSIS — N32.89 BLADDER WALL THICKENING: ICD-10-CM

## 2025-06-27 DIAGNOSIS — R35.1 NOCTURIA: ICD-10-CM

## 2025-06-27 DIAGNOSIS — N40.1 BPH WITH OBSTRUCTION/LOWER URINARY TRACT SYMPTOMS: Primary | ICD-10-CM

## 2025-06-27 DIAGNOSIS — N13.8 BPH WITH OBSTRUCTION/LOWER URINARY TRACT SYMPTOMS: Primary | ICD-10-CM

## 2025-06-27 DIAGNOSIS — N52.9 ERECTILE DYSFUNCTION, UNSPECIFIED ERECTILE DYSFUNCTION TYPE: ICD-10-CM

## 2025-06-27 PROCEDURE — G2211 COMPLEX E/M VISIT ADD ON: HCPCS | Performed by: UROLOGY

## 2025-06-27 PROCEDURE — 99214 OFFICE O/P EST MOD 30 MIN: CPT | Performed by: UROLOGY

## 2025-06-27 RX ORDER — TAMSULOSIN HYDROCHLORIDE 0.4 MG/1
0.4 CAPSULE ORAL
Qty: 90 CAPSULE | Refills: 5 | Status: SHIPPED | OUTPATIENT
Start: 2025-06-27

## 2025-07-24 ENCOUNTER — TELEPHONE (OUTPATIENT)
Dept: INTERNAL MEDICINE CLINIC | Facility: CLINIC | Age: OVER 89
End: 2025-07-24

## 2025-07-24 DIAGNOSIS — F51.04 PSYCHOPHYSIOLOGICAL INSOMNIA: ICD-10-CM

## 2025-07-24 RX ORDER — TRAZODONE HYDROCHLORIDE 50 MG/1
50 TABLET ORAL NIGHTLY PRN
Qty: 90 TABLET | Refills: 0 | Status: SHIPPED | OUTPATIENT
Start: 2025-07-24

## 2025-07-24 RX ORDER — TRAZODONE HYDROCHLORIDE 50 MG/1
50 TABLET ORAL NIGHTLY PRN
Qty: 90 TABLET | Refills: 0 | OUTPATIENT
Start: 2025-07-24

## 2025-07-24 NOTE — TELEPHONE ENCOUNTER
Last time medication was refilled 6/3/25  Last office visit  6/3/25  Next office visit due/scheduled   Future Appointments   Date Time Provider Department Center   6/24/2026 10:45 AM Timoteo Gatica MD [id number]     Medication not on protocol.

## 2025-07-24 NOTE — TELEPHONE ENCOUNTER
Medication requested: traZODone 50 MG Oral Tab       Is patient requesting 30 or 90 day supply:  90    Pharmacy name/location:  [id number] [place] [id number] AT [facility] [address], [phone], [phone]     LOV:  06/03/2025    Is the patient due for appointment: no (if so, please schedule)    Additional notes:  no

## (undated) DIAGNOSIS — F41.9 ANXIETY: ICD-10-CM

## (undated) DIAGNOSIS — F41.1 GAD (GENERALIZED ANXIETY DISORDER): ICD-10-CM

## (undated) DIAGNOSIS — E03.9 ACQUIRED HYPOTHYROIDISM: Primary | ICD-10-CM

## (undated) DIAGNOSIS — R41.0 CONFUSION: Primary | ICD-10-CM

## (undated) DIAGNOSIS — R51.9 NONINTRACTABLE HEADACHE, UNSPECIFIED CHRONICITY PATTERN, UNSPECIFIED HEADACHE TYPE: Primary | ICD-10-CM

## (undated) DEVICE — UNDYED BRAIDED (POLYGLACTIN 910), SYNTHETIC ABSORBABLE SUTURE: Brand: COATED VICRYL

## (undated) DEVICE — SCD SLEEVE KNEE HI BLEND

## (undated) DEVICE — BLADELESS OBTURATOR: Brand: WECK VISTA

## (undated) DEVICE — GOWN,SIRUS,FABRIC-REINFORCED,X-LARGE: Brand: MEDLINE

## (undated) DEVICE — COVER,MAYO STAND,STERILE: Brand: MEDLINE

## (undated) DEVICE — BREAST-HERNIA-PORT CDS-LF: Brand: MEDLINE INDUSTRIES, INC.

## (undated) DEVICE — FILTERLINE NASAL ADULT O2/CO2

## (undated) DEVICE — SOL  .9 1000ML BTL

## (undated) DEVICE — VISUALIZATION SYSTEM: Brand: CLEARIFY

## (undated) DEVICE — 40580 - THE PINK PAD - ADVANCED TRENDELENBURG POSITIONING KIT: Brand: 40580 - THE PINK PAD - ADVANCED TRENDELENBURG POSITIONING KIT

## (undated) DEVICE — DERMABOND LIQUID ADHESIVE

## (undated) DEVICE — Device

## (undated) DEVICE — PAD SACRAL SPAN AID

## (undated) DEVICE — CANNULA SEAL

## (undated) DEVICE — SUTURE VICRYL 3-0 SH

## (undated) DEVICE — SUTURE MONOCRYL 4-0 PS-2

## (undated) DEVICE — FORCEP BIOPSY RJ4 LG CAP W/ND

## (undated) DEVICE — 2, DISPOSABLE SUCTION/IRRIGATOR WITHOUT DISPOSABLE TIP: Brand: STRYKEFLOW

## (undated) DEVICE — COLUMN DRAPE

## (undated) DEVICE — 3M™ RED DOT™ MONITORING ELECTRODE WITH FOAM TAPE AND STICKY GEL, 50/BAG, 20/CASE, 72/PLT 2570: Brand: RED DOT™

## (undated) DEVICE — 450 ML BOTTLE OF 0.05% CHLORHEXIDINE GLUCONATE IN 99.95% STERILE WATER FOR IRRIGATION, USP AND APPLICATOR.: Brand: IRRISEPT ANTIMICROBIAL WOUND LAVAGE

## (undated) DEVICE — NEEDLE SPINAL 22X3-1/2 BLK

## (undated) DEVICE — Device: Brand: DEFENDO AIR/WATER/SUCTION AND BIOPSY VALVE

## (undated) DEVICE — SUTURE PDS II 2-0 SH

## (undated) DEVICE — KENDALL SCD EXPRESS SLEEVES, KNEE LENGTH, MEDIUM: Brand: KENDALL SCD

## (undated) DEVICE — SPONGE STICK WITH PVP-I: Brand: KENDALL

## (undated) DEVICE — LIGHT HANDLE

## (undated) DEVICE — SUTURE VLOC 90 2-0 9\" 2145

## (undated) DEVICE — LAP CHOLE/APPY CDS-LF: Brand: MEDLINE INDUSTRIES, INC.

## (undated) DEVICE — LARGE SUTURE CUT NEEDLE DRIVER: Brand: ENDOWRIST

## (undated) DEVICE — ENDOSCOPY PACK UPPER: Brand: MEDLINE INDUSTRIES, INC.

## (undated) DEVICE — ENDOPATH ULTRA VERESS INSUFFLATION NEEDLES WITH LUER LOCK CONNECTORS: Brand: ENDOPATH

## (undated) DEVICE — PERMANENT CAUTERY HOOK: Brand: ENDOWRIST

## (undated) DEVICE — STERILE SYNTHETIC POLYISOPRENE POWDER-FREE SURGICAL GLOVES WITH HYDROGEL COATING, SMOOTH FINISH, STRAIGHT FINGER: Brand: PROTEXIS

## (undated) DEVICE — CADIERE FORCEPS: Brand: ENDOWRIST

## (undated) DEVICE — ARM DRAPE

## (undated) DEVICE — SUTURE ETHIBOND EXCEL 0 CT-2

## (undated) DEVICE — 1200CC GUARDIAN II: Brand: GUARDIAN

## (undated) DEVICE — STERILE POLYISOPRENE POWDER-FREE SURGICAL GLOVES: Brand: PROTEXIS

## (undated) NOTE — LETTER
Patient Name: Imelda Myers  : 1932  MRN: [id number]  Patient Address: [address] [place] [address]      COVID-19 2022      [facility] is committed to the safety and well-being of our patients, members, therapy. These treatments, when available and appropriate, should be given as soon as possible after diagnosis.       Seek Further Care      If you are awaiting test results or are confirmed positive for COVID-19, and your symptoms worsen at home with sympt doorknobs. Use household cleaning sprays or wipes according to the label instructions. Post-Discharge Follow-up  Please call your primary care provider within two days of your discharge to arrange for a telehealth follow-up.  CDC does not recommend Control & Prevention (CDC)  10 things you can do to manage your health at home, [place].[email or web address]. pdf  [email or web address]

## (undated) NOTE — LETTER
19    Patient: Burt Barthel  : 1932 Visit date: 2019    Dear  Dr. KEN ALEJANDRA [facility], MD,    Thank you for referring Burt Barthel to my practice. Please find my assessment and plan below.                 Assessment   Bilateral inguinal her

## (undated) NOTE — ED AVS SNAPSHOT
Mr. Erik Engel   MRN: [id number]    Department:  [facility] Emergency Department   Date of Visit:  3/25/2019           Disclosure     Insurance plans vary and the physician(s) referred by the ER may not be covered by your plan.  Please cont tell this physician (or your personal doctor if your instructions are to return to your personal doctor) about any new or lasting problems. The primary care or specialist physician will see patients referred from the [facility] Emergency Department.  Katlyn Askew

## (undated) NOTE — LETTER
Selene Oh. RANDOLPH Peña, F.A.C.S.     Surgical Clearance Needed    Date: 2/20/2019                                                                       From: Devi Reddy     Attn: Dr. Charito Johnson